# Patient Record
Sex: FEMALE | Race: WHITE | NOT HISPANIC OR LATINO | Employment: UNEMPLOYED | URBAN - METROPOLITAN AREA
[De-identification: names, ages, dates, MRNs, and addresses within clinical notes are randomized per-mention and may not be internally consistent; named-entity substitution may affect disease eponyms.]

---

## 2017-01-08 ENCOUNTER — HOSPITAL ENCOUNTER (INPATIENT)
Facility: HOSPITAL | Age: 62
LOS: 1 days | Discharge: HOME | End: 2017-01-10
Attending: EMERGENCY MEDICINE | Admitting: INTERNAL MEDICINE

## 2017-01-08 DIAGNOSIS — R93.5 ABNORMAL ABDOMINAL CT SCAN: ICD-10-CM

## 2017-01-08 DIAGNOSIS — D72.829 LEUKOCYTOSIS, UNSPECIFIED TYPE: ICD-10-CM

## 2017-01-08 DIAGNOSIS — K52.9 COLITIS: Primary | ICD-10-CM

## 2017-01-08 PROCEDURE — 99284 EMERGENCY DEPT VISIT MOD MDM: CPT

## 2017-01-08 NOTE — IP AVS SNAPSHOT
AFTER VISIT SUMMARY             Elif Cardona           About your hospitalization     You were admitted on:  January 9, 2017 You last received care in the:  [place] [address] [facility]       Procedures & Surgeries         Medications    If you or your caregiver advised us that you are currently taking a medication and that medication is marked below as “Resume”, this simply indicates that we have reviewed those medications to make sure our new therapy recommendations do not interfere.  If you have concerns about medications other than those new ones which we are prescribing today, please consult the physician who prescribed them (or your primary physician).  Our review of your home medications is not meant to indicate that we are directing their use.             Your Medications      START taking these medications     metroNIDAZOLE 500 MG tablet   Take 1 tablet by mouth Every 8 (Eight) Hours for 10 days. Indications: Clostridium Difficile Infection   Last time this was given:  1/10/2017  5:39 AM   Commonly known as:  FLAGYL           simethicone 80 MG chewable tablet   Chew 1 tablet 4 (Four) Times a Day As Needed for flatulence.   Commonly known as:  MYLICON             CHANGE how you take these medications     alendronate 70 MG tablet   Take 1 tablet by mouth every 7 days. For bone   According to our records, you may have been taking this medication differently.   Commonly known as:  FOSAMAX   What changed:  additional instructions             CONTINUE taking these medications     cetirizine 10 MG chewable tablet   Chew 10 mg daily.   Commonly known as:  ZyrTEC           desvenlafaxine 50 MG 24 hr tablet   Take 50 mg by mouth Daily.   Last time this was given:  1/10/2017  8:16 AM   Commonly known as:  PRISTIQ           gabapentin 100 MG capsule   Take two pills three times per day   Commonly known as:  NEURONTIN           hyoscyamine 0.375 MG 12 hr tablet   Take 1 tablet by mouth every 12 (twelve) hours as  needed for cramping. PRN Abd cramping and IBS   Commonly known as:  LEVBID           lidocaine-hydrocortisone 3-0.5 % cream rectal cream   Change order to this dose and still BID prn           NYQUIL PO   Take  by mouth Daily As Needed.           risperiDONE 1 MG tablet   Take 2 mg by mouth Daily.   Last time this was given:  1/10/2017  8:16 AM   Commonly known as:  risperDAL             STOP taking these medications     cefdinir 300 MG capsule   Commonly known as:  OMNICEF           MethylPREDNISolone 4 MG tablet   Commonly known as:  MEDROL (DAMIAN)                Where to Get Your Medications      These medications were sent to [facility] [address] [place] [address] - [address] AT [facility] - [phone]  - [phone]   [address], [facility] [address]    Hours:  24-hours Phone:  [phone]     metroNIDAZOLE 500 MG tablet    simethicone 80 MG chewable tablet                  Your Medications      Your Medication List           Morning Noon Evening Bedtime As Needed    alendronate 70 MG tablet   Take 1 tablet by mouth every 7 days. For bone   Commonly known as:  FOSAMAX                                cetirizine 10 MG chewable tablet   Chew 10 mg daily.   Commonly known as:  ZyrTEC                                   desvenlafaxine 50 MG 24 hr tablet   Take 50 mg by mouth Daily.   Commonly known as:  PRISTIQ                                   gabapentin 100 MG capsule   Take two pills three times per day   Commonly known as:  NEURONTIN                                         hyoscyamine 0.375 MG 12 hr tablet   Take 1 tablet by mouth every 12 (twelve) hours as needed for cramping. PRN Abd cramping and IBS   Commonly known as:  LEVBID                                   lidocaine-hydrocortisone 3-0.5 % cream rectal cream   Change order to this dose and still BID prn                                      metroNIDAZOLE 500 MG tablet   Take 1 tablet by mouth Every 8  (Eight) Hours for 10 days. Indications: Clostridium Difficile Infection   Commonly known as:  FLAGYL                                NYQUIL PO   Take  by mouth Daily As Needed.                                   risperiDONE 1 MG tablet   Take 2 mg by mouth Daily.   Commonly known as:  risperDAL                                   simethicone 80 MG chewable tablet   Chew 1 tablet 4 (Four) Times a Day As Needed for flatulence.   Commonly known as:  MYLICON                                            Instructions for After Discharge        Discharge References/Attachments     CLOSTRIDIUM DIFFICILE INFECTION, EASY-TO-READ (ENGLISH)    METRONIDAZOLE TABLETS OR CAPSULES (ENGLISH)    SIMETHICONE CHEWABLE TABLETS (ENGLISH)       Follow-ups for After Discharge        Follow-up Information     Follow up with CHRISTINA Pearson Follow up in 4 week(s).    Specialty:  Gastroenterology    Contact information:    [address] [facility] [address]  [facility] [address] [phone]          Follow up with Quinton Sin MD Follow up in 1 week(s).    Specialty:  Family Medicine    Contact information:    [address]  [facility] [address] [phone]        Scheduled Appointments     2017  1:00 PM [facility] Follow Up with Quinton Sin MD   [facility] (--)    [address] [facility]  [facility] [id number] [phone]              [employer] Signup     [facility] [employer] allows you to send messages to your doctor, view your test results, renew your prescriptions, schedule appointments, and more. To sign up, go to [employer] and click on the Sign Up Now link in the New User? box. Enter your [employer] Activation Code exactly as it appears below along with the last four digits of your Social Security Number and your Date of Birth () to complete the sign-up process. If you do not sign up before the expiration date, you must request a new  code.    MyChart Activation Code: [email or web address]  Expires: 1/20/2017  5:40 AM    If you have questions, you can email [email or web address] or call [phone] to talk to our MyChart staff. Remember, MyChart is NOT to be used for urgent needs. For medical emergencies, dial 911.           Summary of Your Hospitalization        Reason for Hospitalization     Your primary diagnosis was:  C. Difficile Colitis    Your diagnoses also included:  Infection In Bloodstream, Generalized Anxiety Disorder, Benign Essential Hypertension, Spasmodic Colon, Mental Health Problem, Solitary Pulmonary Nodule On Lung Ct, Pancreatic Divisum      Care Providers     Provider Service Role Specialty    Garfield Holm MD Medicine Attending Provider Internal Medicine    Phoebe Mac MD Gastroenterology Consulting Physician  Gastroenterology      Your Allergies  Date Reviewed: 1/9/2017    No active allergies      Pending Labs     Order Current Status    Stool Culture Preliminary result      Patient Belongings Returned     Document Return of Belongings Flowsheet     Were the patient bedside belongings sent home?   Yes   Belongings Retrieved from Security & Sent Home   N/A    Belongings Sent to Safe   --   Medications Retrieved from Pharmacy & Sent Home   N/A              More Information      Clostridium Difficile Infection  Clostridium difficile (C. difficile or C. diff) is a germ found in the intestines. C. difficile infection can happen after taking antibiotic medicines. Antiobiotics may cause the C. difficile to grow out of control and make a toxin that causes the infection. C. difficile infection can cause watery poop (diarrhea) or severe disease.  HOME CARE  · Drink enough fluids to keep your pee (urine) clear or pale yellow. Avoid milk, caffeine, and alcohol.  · Ask your doctor how to replace the body fluids you lost (rehydrate).  · Eat small meals often. Avoid eating large meals.  · Take your antibiotics as told.  Finish them even if you start to feel better.  · Do not  take medicines that help watery poop. These medicines may stop you from healing as fast or cause problems.  · Wash your hands after using the bathroom and before touching food. Make sure people who live with you wash their hands often.  · Clean all surfaces in your home. Use a product that has chlorine bleach in it.  GET HELP IF:  · Your watery poop lasts longer than expected.  · Your watery poop comes back after you finish your antibiotic medicine.  · You feel very dry or thirsty (dehydrated).  · You have a fever.  GET HELP RIGHT AWAY IF:   · You have more stomach pain or tenderness.  · You have blood in your poop (stool). Your poop may look black and tar-like.  · You cannot eat or drink without throwing up (vomiting).     This information is not intended to replace advice given to you by your health care provider. Make sure you discuss any questions you have with your health care provider.     Document Released: 10/15/2010 Document Revised: 01/08/2016 Document Reviewed: 06/20/2016  [employer] Interactive Patient Education ©2016 Elsevier Inc.          Metronidazole tablets or capsules  What is this medicine?  METRONIDAZOLE (me troe NI da zole) is an antiinfective. It is used to treat certain kinds of bacterial and protozoal infections. It will not work for colds, flu, or other viral infections.  This medicine may be used for other purposes; ask your health care provider or pharmacist if you have questions.  What should I tell my health care provider before I take this medicine?  They need to know if you have any of these conditions:  -anemia or other blood disorders  -disease of the nervous system  -fungal or yeast infection  -if you drink alcohol containing drinks  -liver disease  -seizures  -an unusual or allergic reaction to metronidazole, or other medicines, foods, dyes, or preservatives  -pregnant or trying to get pregnant  -breast-feeding  How should I  use this medicine?  Take this medicine by mouth with a full glass of water. Follow the directions on the prescription label. Take your medicine at regular intervals. Do not take your medicine more often than directed. Take all of your medicine as directed even if you think you are better. Do not skip doses or stop your medicine early.  Talk to your pediatrician regarding the use of this medicine in children. Special care may be needed.  Overdosage: If you think you have taken too much of this medicine contact a poison control center or emergency room at once.  NOTE: This medicine is only for you. Do not share this medicine with others.  What if I miss a dose?  If you miss a dose, take it as soon as you can. If it is almost time for your next dose, take only that dose. Do not take double or extra doses.  What may interact with this medicine?  Do not take this medicine with any of the following medications:  -alcohol or any product that contains alcohol  -amprenavir oral solution  -cisapride  -disulfiram  -dofetilide  -dronedarone  -paclitaxel injection  -pimozide  -ritonavir oral solution  -sertraline oral solution  -sulfamethoxazole-trimethoprim injection  -thioridazine  -ziprasidone  This medicine may also interact with the following medications:  -birth control pills  -cimetidine  -lithium  -other medicines that prolong the QT interval (cause an abnormal heart rhythm)  -phenobarbital  -phenytoin  -warfarin  This list may not describe all possible interactions. Give your health care provider a list of all the medicines, herbs, non-prescription drugs, or dietary supplements you use. Also tell them if you smoke, drink alcohol, or use illegal drugs. Some items may interact with your medicine.  What should I watch for while using this medicine?  Tell your doctor or health care professional if your symptoms do not improve or if they get worse.  You may get drowsy or dizzy. Do not drive, use machinery, or do anything  that needs mental alertness until you know how this medicine affects you. Do not stand or sit up quickly, especially if you are an older patient. This reduces the risk of dizzy or fainting spells.  Avoid alcoholic drinks while you are taking this medicine and for three days afterward. Alcohol may make you feel dizzy, sick, or flushed.  If you are being treated for a sexually transmitted disease, avoid sexual contact until you have finished your treatment. Your sexual partner may also need treatment.  What side effects may I notice from receiving this medicine?  Side effects that you should report to your doctor or health care professional as soon as possible:  -allergic reactions like skin rash or hives, swelling of the face, lips, or tongue  -confusion, clumsiness  -difficulty speaking  -discolored or sore mouth  -dizziness  -fever, infection  -numbness, tingling, pain or weakness in the hands or feet  -trouble passing urine or change in the amount of urine  -redness, blistering, peeling or loosening of the skin, including inside the mouth  -seizures  -unusually weak or tired  -vaginal irritation, dryness, or discharge  Side effects that usually do not require medical attention (report to your doctor or health care professional if they continue or are bothersome):  -diarrhea  -headache  -irritability  -metallic taste  -nausea  -stomach pain or cramps  -trouble sleeping  This list may not describe all possible side effects. Call your doctor for medical advice about side effects. You may report side effects to FDA at 8-332-FDA-9100.  Where should I keep my medicine?  Keep out of the reach of children.  Store at room temperature below 25 degrees C (77 degrees F). Protect from light. Keep container tightly closed. Throw away any unused medicine after the expiration date.  NOTE: This sheet is a summary. It may not cover all possible information. If you have questions about this medicine, talk to your doctor, pharmacist,  or health care provider.     © 2016, Elsevier/Gold Standard. (2014-07-25 14:08:39)          Simethicone chewable tablets  What is this medicine?  SIMETHICONE (sye METH i gurinder) is used to decrease the discomfort caused by gas.  This medicine may be used for other purposes; ask your health care provider or pharmacist if you have questions.  What should I tell my health care provider before I take this medicine?  They need to know if you have any of these conditions:  -phenylketonuria  -an unusual or allergic reaction to simethicone, other medicines, foods, dyes, or preservatives  -pregnant or trying to get pregnant  -breast-feeding  How should I use this medicine?  Take this medicine by mouth. Crush or chew the tablets. Do not swallow them whole. Follow the directions on the label or those given to you by your doctor or health care professional. Do not take your medicine more often than directed.  Talk to your pediatrician regarding the use of this medicine in children. While this medicine may be used in children as young as 12 years for selected conditions, precautions do apply.  Overdosage: If you think you have taken too much of this medicine contact a poison control center or emergency room at once.  NOTE: This medicine is only for you. Do not share this medicine with others.  What if I miss a dose?  This does not apply. You will only use this medicine as needed for gas pain. Do not use double or extra doses.  What may interact with this medicine?  Interactions are not expected.  This list may not describe all possible interactions. Give your health care provider a list of all the medicines, herbs, non-prescription drugs, or dietary supplements you use. Also tell them if you smoke, drink alcohol, or use illegal drugs. Some items may interact with your medicine.  What should I watch for while using this medicine?  Tell your doctor or health care professional if your symptoms get worse, or if you have severe pain,  diarrhea, constipation, or blood in your stool. These could be signs of a more serious condition.  What side effects may I notice from receiving this medicine?  There are no reported side effects of this medicine.  This list may not describe all possible side effects. Call your doctor for medical advice about side effects. You may report side effects to FDA at 7-620-OAW-1250.  Where should I keep my medicine?  Keep out of the reach of children.  Store at room temperature between 15 and 30 degrees C (59 and 86 degrees F). Keep container tightly closed. Throw away any unused medicine after the expiration date.  NOTE: This sheet is a summary. It may not cover all possible information. If you have questions about this medicine, talk to your doctor, pharmacist, or health care provider.     © 2016, Elsevier/Gold Standard. (2009-08-19 15:45:11)            SYMPTOMS OF A STROKE    Call 911 or have someone take you to the Emergency Department if you have any of the following:    · Sudden numbness or weakness of your face, arm or leg especially on one side of the body  · Sudden confusion, diffiiculty speaking or trouble understanding   · Changes in your vision or loss of sight in one eye  · Sudden severe headache with no known cause  · sudden dizziness, trouble walking, loss of balance or coordination    It is important to seek emergency care right away if you have further stroke symptoms. If you get emergency help quickly, the powerful clot-dissolving medicines can reduce the disabilities caused by a stroke.     For more information:    American Stroke Association  5-223-7-STROKE  www.strokeassociation.org           IF YOU SMOKE OR USE TOBACCO PLEASE READ THE FOLLOWING:    Why is smoking bad for me?  Smoking increases the risk of heart disease, lung disease, vascular disease, stroke, and cancer.     If you smoke, STOP!    If you would like more information on quitting smoking, please visit the [employer] website:  [email or web address]   This link will provide additional resources including the QUIT line and the Beat the Pack support groups.     For more information:    American Cancer Society  ([phone] 935-8647    American Heart Association  1-640.437.1981               YOU ARE THE MOST IMPORTANT FACTOR IN YOUR RECOVERY.     Follow all instructions carefully.     I have reviewed my discharge instructions with my nurse, including the following information, if applicable:     Information about my illness and diagnosis   Follow up appointments (including lab draws)   Wound Care   Equipment Needs   Medications (new and continuing) along with side effects   Preventative information such as vaccines and smoking cessations   Diet   Pain   I know when to contact my Doctor's office or seek emergency care      I want my nurse to describe the side effects of my medications: YES NO   If the answer is no, I understand the side effects of my medications: YES NO   My nurse described the side effects of my medications in a way that I could understand: YES NO   I have taken my personal belongings and my own medications with me at discharge: YES NO            I have received this information and my questions have been answered. I have discussed any concerns I see with this plan with the nurse or physician. I understand these instructions.    Signature of Patient or Responsible Person: _____________________________________    Date: _________________  Time: __________________    Signature of Healthcare Provider: _______________________________________  Date: _________________  Time: __________________

## 2017-01-09 ENCOUNTER — APPOINTMENT (OUTPATIENT)
Dept: CT IMAGING | Facility: HOSPITAL | Age: 62
End: 2017-01-09

## 2017-01-09 PROBLEM — D72.829 LEUKOCYTOSIS: Status: ACTIVE | Noted: 2017-01-09

## 2017-01-09 PROBLEM — A41.9 SEPSIS (HCC): Status: ACTIVE | Noted: 2017-01-09

## 2017-01-09 PROBLEM — K52.9 COLITIS: Status: ACTIVE | Noted: 2017-01-09

## 2017-01-09 PROBLEM — Q45.3 PANCREATIC DIVISUM: Status: ACTIVE | Noted: 2017-01-09

## 2017-01-09 PROBLEM — A04.72 C. DIFFICILE COLITIS: Status: ACTIVE | Noted: 2017-01-09

## 2017-01-09 PROBLEM — R91.1 SOLITARY PULMONARY NODULE ON LUNG CT: Status: ACTIVE | Noted: 2017-01-09

## 2017-01-09 LAB
ALBUMIN SERPL-MCNC: 4 G/DL (ref 3.5–5.2)
ALBUMIN/GLOB SERPL: 1.5 G/DL
ALP SERPL-CCNC: 67 U/L (ref 39–117)
ALT SERPL W P-5'-P-CCNC: 13 U/L (ref 1–33)
ANION GAP SERPL CALCULATED.3IONS-SCNC: 13.4 MMOL/L
AST SERPL-CCNC: 15 U/L (ref 1–32)
BACTERIA UR QL AUTO: ABNORMAL /HPF
BASOPHILS # BLD AUTO: 0.06 10*3/MM3 (ref 0–0.2)
BASOPHILS NFR BLD AUTO: 0.4 % (ref 0–1.5)
BILIRUB SERPL-MCNC: 0.3 MG/DL (ref 0.1–1.2)
BILIRUB UR QL STRIP: NEGATIVE
BUN BLD-MCNC: 6 MG/DL (ref 6–20)
BUN/CREAT SERPL: 8.3 (ref 7–25)
C DIFF TOX GENS STL QL NAA+PROBE: POSITIVE
CALCIUM SPEC-SCNC: 9.1 MG/DL (ref 8.6–10.5)
CHLORIDE SERPL-SCNC: 100 MMOL/L (ref 98–107)
CLARITY UR: CLEAR
CO2 SERPL-SCNC: 26.6 MMOL/L (ref 22–29)
COLOR UR: YELLOW
CREAT BLD-MCNC: 0.72 MG/DL (ref 0.57–1)
DEPRECATED RDW RBC AUTO: 46 FL (ref 37–54)
EOSINOPHIL # BLD AUTO: 0.11 10*3/MM3 (ref 0–0.7)
EOSINOPHIL NFR BLD AUTO: 0.7 % (ref 0.3–6.2)
ERYTHROCYTE [DISTWIDTH] IN BLOOD BY AUTOMATED COUNT: 13.2 % (ref 11.7–13)
GFR SERPL CREATININE-BSD FRML MDRD: 83 ML/MIN/1.73
GLOBULIN UR ELPH-MCNC: 2.7 GM/DL
GLUCOSE BLD-MCNC: 118 MG/DL (ref 65–99)
GLUCOSE UR STRIP-MCNC: NEGATIVE MG/DL
HCT VFR BLD AUTO: 41.7 % (ref 35.6–45.5)
HGB BLD-MCNC: 12.9 G/DL (ref 11.9–15.5)
HGB UR QL STRIP.AUTO: ABNORMAL
HYALINE CASTS UR QL AUTO: ABNORMAL /LPF
IMM GRANULOCYTES # BLD: 0.04 10*3/MM3 (ref 0–0.03)
IMM GRANULOCYTES NFR BLD: 0.3 % (ref 0–0.5)
KETONES UR QL STRIP: NEGATIVE
LEUKOCYTE ESTERASE UR QL STRIP.AUTO: NEGATIVE
LIPASE SERPL-CCNC: 20 U/L (ref 13–60)
LYMPHOCYTES # BLD AUTO: 2.01 10*3/MM3 (ref 0.9–4.8)
LYMPHOCYTES NFR BLD AUTO: 13 % (ref 19.6–45.3)
MCH RBC QN AUTO: 29.8 PG (ref 26.9–32)
MCHC RBC AUTO-ENTMCNC: 30.9 G/DL (ref 32.4–36.3)
MCV RBC AUTO: 96.3 FL (ref 80.5–98.2)
MONOCYTES # BLD AUTO: 0.81 10*3/MM3 (ref 0.2–1.2)
MONOCYTES NFR BLD AUTO: 5.2 % (ref 5–12)
NEUTROPHILS # BLD AUTO: 12.4 10*3/MM3 (ref 1.9–8.1)
NEUTROPHILS NFR BLD AUTO: 80.4 % (ref 42.7–76)
NITRITE UR QL STRIP: NEGATIVE
PH UR STRIP.AUTO: 6.5 [PH] (ref 5–8)
PLATELET # BLD AUTO: 479 10*3/MM3 (ref 140–500)
PMV BLD AUTO: 9.4 FL (ref 6–12)
POTASSIUM BLD-SCNC: 4 MMOL/L (ref 3.5–5.2)
PROT SERPL-MCNC: 6.7 G/DL (ref 6–8.5)
PROT UR QL STRIP: NEGATIVE
RBC # BLD AUTO: 4.33 10*6/MM3 (ref 3.9–5.2)
RBC # UR: ABNORMAL /HPF
REF LAB TEST METHOD: ABNORMAL
SODIUM BLD-SCNC: 140 MMOL/L (ref 136–145)
SP GR UR STRIP: >=1.03 (ref 1–1.03)
SQUAMOUS #/AREA URNS HPF: ABNORMAL /HPF
UROBILINOGEN UR QL STRIP: ABNORMAL
WBC NRBC COR # BLD: 15.43 10*3/MM3 (ref 4.5–10.7)
WBC UR QL AUTO: ABNORMAL /HPF

## 2017-01-09 PROCEDURE — 0 IOPAMIDOL 61 % SOLUTION: Performed by: EMERGENCY MEDICINE

## 2017-01-09 PROCEDURE — 87046 STOOL CULTR AEROBIC BACT EA: CPT | Performed by: PHYSICIAN ASSISTANT

## 2017-01-09 PROCEDURE — 99254 IP/OBS CNSLTJ NEW/EST MOD 60: CPT | Performed by: INTERNAL MEDICINE

## 2017-01-09 PROCEDURE — 87493 C DIFF AMPLIFIED PROBE: CPT | Performed by: PHYSICIAN ASSISTANT

## 2017-01-09 PROCEDURE — 85025 COMPLETE CBC W/AUTO DIFF WBC: CPT | Performed by: PHYSICIAN ASSISTANT

## 2017-01-09 PROCEDURE — 25010000002 ENOXAPARIN PER 10 MG: Performed by: INTERNAL MEDICINE

## 2017-01-09 PROCEDURE — 80053 COMPREHEN METABOLIC PANEL: CPT | Performed by: PHYSICIAN ASSISTANT

## 2017-01-09 PROCEDURE — 74177 CT ABD & PELVIS W/CONTRAST: CPT

## 2017-01-09 PROCEDURE — 83690 ASSAY OF LIPASE: CPT | Performed by: PHYSICIAN ASSISTANT

## 2017-01-09 PROCEDURE — 81001 URINALYSIS AUTO W/SCOPE: CPT | Performed by: PHYSICIAN ASSISTANT

## 2017-01-09 PROCEDURE — 25010000002 LEVOFLOXACIN PER 250 MG: Performed by: HOSPITALIST

## 2017-01-09 PROCEDURE — 87045 FECES CULTURE AEROBIC BACT: CPT | Performed by: PHYSICIAN ASSISTANT

## 2017-01-09 RX ORDER — CETIRIZINE HYDROCHLORIDE 10 MG/1
10 TABLET ORAL DAILY
Status: DISCONTINUED | OUTPATIENT
Start: 2017-01-09 | End: 2017-01-10 | Stop reason: HOSPADM

## 2017-01-09 RX ORDER — RISPERIDONE 2 MG/1
2 TABLET ORAL DAILY
Status: DISCONTINUED | OUTPATIENT
Start: 2017-01-09 | End: 2017-01-10 | Stop reason: HOSPADM

## 2017-01-09 RX ORDER — LEVOFLOXACIN 5 MG/ML
750 INJECTION, SOLUTION INTRAVENOUS ONCE
Status: DISCONTINUED | OUTPATIENT
Start: 2017-01-09 | End: 2017-01-09

## 2017-01-09 RX ORDER — SIMETHICONE 80 MG
80 TABLET,CHEWABLE ORAL 4 TIMES DAILY PRN
Status: DISCONTINUED | OUTPATIENT
Start: 2017-01-09 | End: 2017-01-10 | Stop reason: HOSPADM

## 2017-01-09 RX ORDER — ONDANSETRON 2 MG/ML
4 INJECTION INTRAMUSCULAR; INTRAVENOUS EVERY 6 HOURS PRN
Status: DISCONTINUED | OUTPATIENT
Start: 2017-01-09 | End: 2017-01-10 | Stop reason: HOSPADM

## 2017-01-09 RX ORDER — SODIUM CHLORIDE 0.9 % (FLUSH) 0.9 %
1-10 SYRINGE (ML) INJECTION AS NEEDED
Status: DISCONTINUED | OUTPATIENT
Start: 2017-01-09 | End: 2017-01-10 | Stop reason: HOSPADM

## 2017-01-09 RX ORDER — METRONIDAZOLE 500 MG/1
500 TABLET ORAL EVERY 8 HOURS SCHEDULED
Status: DISCONTINUED | OUTPATIENT
Start: 2017-01-09 | End: 2017-01-10 | Stop reason: HOSPADM

## 2017-01-09 RX ORDER — ACETAMINOPHEN 325 MG/1
650 TABLET ORAL EVERY 4 HOURS PRN
Status: DISCONTINUED | OUTPATIENT
Start: 2017-01-09 | End: 2017-01-10 | Stop reason: HOSPADM

## 2017-01-09 RX ORDER — HYDROCODONE BITARTRATE AND ACETAMINOPHEN 5; 325 MG/1; MG/1
1 TABLET ORAL EVERY 4 HOURS PRN
Status: DISCONTINUED | OUTPATIENT
Start: 2017-01-09 | End: 2017-01-10 | Stop reason: HOSPADM

## 2017-01-09 RX ORDER — SODIUM CHLORIDE 9 MG/ML
125 INJECTION, SOLUTION INTRAVENOUS CONTINUOUS
Status: DISCONTINUED | OUTPATIENT
Start: 2017-01-09 | End: 2017-01-10 | Stop reason: HOSPADM

## 2017-01-09 RX ORDER — HYOSCYAMINE SULFATE EXTENDED-RELEASE 0.38 MG/1
375 TABLET ORAL EVERY 12 HOURS PRN
Status: DISCONTINUED | OUTPATIENT
Start: 2017-01-09 | End: 2017-01-10 | Stop reason: HOSPADM

## 2017-01-09 RX ORDER — DESVENLAFAXINE 50 MG/1
50 TABLET, EXTENDED RELEASE ORAL DAILY
Status: DISCONTINUED | OUTPATIENT
Start: 2017-01-09 | End: 2017-01-10 | Stop reason: HOSPADM

## 2017-01-09 RX ORDER — LEVOFLOXACIN 5 MG/ML
500 INJECTION, SOLUTION INTRAVENOUS EVERY 24 HOURS
Status: DISCONTINUED | OUTPATIENT
Start: 2017-01-09 | End: 2017-01-09

## 2017-01-09 RX ORDER — SACCHAROMYCES BOULARDII 250 MG
250 CAPSULE ORAL 2 TIMES DAILY
Status: DISCONTINUED | OUTPATIENT
Start: 2017-01-09 | End: 2017-01-10 | Stop reason: HOSPADM

## 2017-01-09 RX ADMIN — IOPAMIDOL 85 ML: 612 INJECTION, SOLUTION INTRAVENOUS at 01:27

## 2017-01-09 RX ADMIN — HYDROCODONE BITARTRATE AND ACETAMINOPHEN 1 TABLET: 5; 325 TABLET ORAL at 21:38

## 2017-01-09 RX ADMIN — SODIUM CHLORIDE 500 ML: 9 INJECTION, SOLUTION INTRAVENOUS at 03:58

## 2017-01-09 RX ADMIN — RISPERIDONE 2 MG: 2 TABLET, FILM COATED ORAL at 11:48

## 2017-01-09 RX ADMIN — SODIUM CHLORIDE 125 ML/HR: 9 INJECTION, SOLUTION INTRAVENOUS at 17:20

## 2017-01-09 RX ADMIN — HYDROCODONE BITARTRATE AND ACETAMINOPHEN 1 TABLET: 5; 325 TABLET ORAL at 10:41

## 2017-01-09 RX ADMIN — ENOXAPARIN SODIUM 40 MG: 40 INJECTION SUBCUTANEOUS at 10:41

## 2017-01-09 RX ADMIN — DESVENLAFAXINE SUCCINATE 50 MG: 50 TABLET, EXTENDED RELEASE ORAL at 11:48

## 2017-01-09 RX ADMIN — METRONIDAZOLE 500 MG: 500 INJECTION, SOLUTION INTRAVENOUS at 05:52

## 2017-01-09 RX ADMIN — METRONIDAZOLE 500 MG: 500 TABLET ORAL at 21:38

## 2017-01-09 RX ADMIN — SODIUM CHLORIDE 125 ML/HR: 9 INJECTION, SOLUTION INTRAVENOUS at 08:50

## 2017-01-09 RX ADMIN — LEVOFLOXACIN 500 MG: 500 INJECTION, SOLUTION INTRAVENOUS at 07:42

## 2017-01-09 RX ADMIN — METRONIDAZOLE 500 MG: 500 TABLET ORAL at 13:21

## 2017-01-09 RX ADMIN — Medication 250 MG: at 17:20

## 2017-01-09 RX ADMIN — CETIRIZINE HYDROCHLORIDE 10 MG: 10 TABLET, FILM COATED ORAL at 11:48

## 2017-01-09 RX ADMIN — HYDROCODONE BITARTRATE AND ACETAMINOPHEN 1 TABLET: 5; 325 TABLET ORAL at 16:52

## 2017-01-09 NOTE — PROGRESS NOTES
Discharge Planning Assessment  [facility]     Patient Name: Elif Cardona  MRN: [id number]  Today's Date: 1/9/2017    Admit Date: 1/8/2017          Discharge Needs Assessment       01/09/17 1403    Living Environment    Lives With spouse    Living Arrangements house    Provides Primary Care For no one    Quality Of Family Relationships supportive    Able to Return to Prior Living Arrangements yes    Discharge Needs Assessment    Concerns To Be Addressed no discharge needs identified;denies needs/concerns at this time    Anticipated Changes Related to Illness none    Equipment Currently Used at Home none    Equipment Needed After Discharge none    Transportation Available car;family or friend will provide            Discharge Plan       01/09/17 4009    Case Management/Social Work Plan    Plan Home w/o needs    Additional Comments Pt confirmed the address, PCP and pharmacy are correct, she said she is IADL, uses no DME, drives and has transportation. Pt said she can afford her Rx but her dr changed her from Zoloft to Pristiq and it is about $150 / month copay and she is going to ask her dr to change her back to Zoloft.  Pt said she plans home at discharge and does not anticipate any discharge needs.  I talked with pt about [email or web address] but she said she will just ask the dr to change her back to Zoloft.         Discharge Placement     No information found                Demographic Summary       01/09/17 1403    Referral Information    Admission Type inpatient    Arrived From home or self-care    Referral Source admission list            Functional Status       01/09/17 1403    Functional Status Current    Ambulation 0-->independent    Transferring 0-->independent    Toileting 0-->independent    Bathing 0-->independent    Dressing 0-->independent    Eating 0-->independent    Communication 0-->understands/communicates without difficulty    Swallowing (if score 2 or more for any item, consult Rehab Services)  0-->swallows foods/liquids without difficulty    Change in Functional Status Since Onset of Current Illness/Injury no      Patient Forms       01/09/17 1404    Patient Forms    Provider Choice List Delivered    Delivered to Patient    Method of delivery In person          Sofiya Mchugh, RN

## 2017-01-09 NOTE — PAYOR COMM NOTE
"Elif Cardona (61 y.o. Female)     Date of Birth Social Security Number Address Home Phone MRN    1955  [id number]  [facility] [address] [phone] [id number]    Zoroastrian Marital Status          Cheondoism        Admission Date Admission Type Admitting Provider Attending Provider Department, Room/Bed    17 Emergency Garfield Holm MD [place], Garfield Perez MD [address], 79/1    Discharge Date Discharge Disposition Discharge Destination                      Attending Provider: Garfield Holm MD     Allergies:  No Known Allergies    Isolation:  Spore   Infection:  C.difficile (17)   Code Status:  FULL    Ht:  64\" (162.6 cm)   Wt:  150 lb (68 kg)    Admission Cmt:  None   Principal Problem:  C. difficile colitis [A04.7]                 Active Insurance as of 2017     Primary Coverage     Payor Plan Insurance Group Employer/Plan Group    [employer] ANTHEM PATHWAYS NON PAR [id number]     Payor Plan Address Payor Plan Phone Number Effective From Effective To    [address] [phone] 2017     [place] [address]       Subscriber Name Subscriber Birth Date Member ID       TAMICA CARDONA 3/14/1954 [id number]           Secondary Coverage     Payor Plan Insurance Group Employer/Plan Group    [employer] ANTHEM PATHWAYS PAR [id number]     Payor Plan Address Payor Plan Phone Number Effective From Effective To    [address] [phone] 3/1/2016     [place] [address]       Subscriber Name Subscriber Birth Date Member ID       TAMICA CARDONA 3/14/1954 [id number]                 Emergency Contacts      (Rel.) Home Phone Work Phone Mobile Phone    Brian Cardona (Spouse) [phone] -- --    Ewelina Sheffield (Sister) [phone] -- --    Delmy Styles (Sister) [phone] -- --               History & Physical      Garfield Holm MD at 2017 10:29 AM              Name: Elif Cardona ADMIT: 2017   : " 1955  PCP: Quinton Sin MD    MRN: [id number] LOS: 0 days   AGE/SEX: 61 y.o. female  ROOM: [facility]/1     Chief Complaint   Patient presents with   • Diarrhea       Subjective   Patient is a 61 y.o. female presents with the following...    History of Present Illness  The patient is a 61-year-old white male with a previous history for hypertension, schizoaffective disorder, IBS who presented to the hospital for diarrhea.  3-4 days prior to admission she started with what she thought was a stomach flu with nausea, vomiting and diarrhea.  At the current time she actually complains of stomach gas.  Diarrhea has improved overall.  A CT of this can of the abdomen in the emergency room showed colitis and she was admitted to our service.  She has not had any sick contacts but has had recent antibiotics 2 weeks ago with Omnicef for an upper respiratory infection.  She has had some bright red blood per rectum due to this illness as well.  She was concerned about all of the above and came to the ER.  According to emergency room notes the patient has had vaginal bleeding despite menopause but upon further questioning the patient says she thinks it was from her rectum rather than her vagina.  In addition, the CAT scan showed pancreatic divisum with dilation of her pancreatic ducts but there were no masses.  Patient does not have any history of pancreatitis there is no history of pancreatic cancer.  She denies any abdominal pain or nausea at the current time.  Past Medical History   Diagnosis Date   • Allergic rhinitis    • Benign essential hypertension 01/06/2015   • Fibromyalgia    • Generalized anxiety disorder 08/24/2012   • H/O exercise stress test 05/05/2005     CVA neg   • Hemorrhoids 01/27/2010   • History of bone density study    • Irritable bowel syndrome (IBS) 11/11/2009   • Major depressive disorder, recurrent episode, in full remission 09/10/2010   • Osteoarthritis, multiple sites 06/12/2014   • Post-menopausal     • Rash and nonspecific skin eruption 07/08/2014     Angular Chelitis   • Schizoaffective disorder    • Schizoaffective disorder, bipolar type    • Sebaceous cyst 10/25/2011   • Sinusitis, acute      History reviewed. No pertinent past surgical history.  Family History   Problem Relation Age of Onset   • Colon cancer Other    • Depression Other    • Diabetes Other    • Heart disease Other    • Lung cancer Other      Social History   Substance Use Topics   • Smoking status: Former Smoker     Packs/day: 0.50     Quit date: 5/2/2016   • Smokeless tobacco: Never Used   • Alcohol use No     Prescriptions Prior to Admission   Medication Sig Dispense Refill Last Dose   • alendronate (FOSAMAX) 70 MG tablet Take 1 tablet by mouth every 7 days. For bone (Patient taking differently: Take 70 mg by mouth Every 7 (Seven) Days. For bone. Pt took it last on Friday 1/6/17) 4 tablet 11 Taking   • cefdinir (OMNICEF) 300 MG capsule Two caps PO daily for infection 20 capsule 1 Past Week at Unknown time   • cetirizine (ZyrTEC) 10 MG chewable tablet Chew 10 mg daily.   Taking   • desvenlafaxine (PRISTIQ) 50 MG 24 hr tablet Take 50 mg by mouth Daily.   Past Week at Unknown time   • hyoscyamine (LEVBID) 0.375 MG 12 hr tablet Take 1 tablet by mouth every 12 (twelve) hours as needed for cramping. PRN Abd cramping and IBS 60 tablet 5 1/8/2017 at Unknown time   • lidocaine-hydrocortisone 3-0.5 % cream rectal cream Change order to this dose and still BID prn 85 g 2 Past Week at Unknown time   • Pseudoeph-Doxylamine-DM-APAP (NYQUIL PO) Take  by mouth Daily As Needed.   Taking   • risperiDONE (RisperDAL) 1 MG tablet Take 2 mg by mouth Daily.   Past Week at Unknown time   • gabapentin (NEURONTIN) 100 MG capsule Take two pills three times per day 90 capsule 0 Taking   • MethylPREDNISolone (MEDROL, DAMIAN,) 4 MG tablet follow package directions 21 tablet 0      Allergies:  Review of patient's allergies indicates no known allergies.    Review of Systems    Constitutional: Positive for appetite change and fatigue. Negative for activity change, fever and unexpected weight change.   HENT: Negative for mouth sores, sore throat and trouble swallowing.    Eyes: Negative for pain and visual disturbance.   Respiratory: Negative for cough, chest tightness and shortness of breath.    Cardiovascular: Negative for chest pain, palpitations and leg swelling.   Gastrointestinal: Positive for blood in stool and diarrhea. Negative for abdominal pain, constipation, nausea and vomiting.   Endocrine:        Negative for Diabetes or thyroid disease   Genitourinary: Negative for difficulty urinating, hematuria, menstrual problem and vaginal bleeding.   Musculoskeletal: Negative.  Negative for back pain, neck pain and neck stiffness.   Skin: Negative for rash and wound.   Neurological: Negative for dizziness, syncope, weakness, light-headedness and headaches.   Hematological: Negative for adenopathy. Does not bruise/bleed easily.   Psychiatric/Behavioral: Negative for agitation, behavioral problems and confusion.        Objective    Vital Signs  Temp:  [97 °F (36.1 °C)-98.3 °F (36.8 °C)] 98.1 °F (36.7 °C)  Heart Rate:  [] 90  Resp:  [16-18] 16  BP: (151-163)/(81-97) 155/97  SpO2:  [90 %-96 %] 96 %  on   ;   O2 Device: room air  Body mass index is 25.75 kg/(m^2).    Physical Exam   Constitutional: She is oriented to person, place, and time. She appears well-developed and well-nourished. No distress.   HENT:   Head: Normocephalic and atraumatic.   Mouth/Throat: Oropharynx is clear and moist. No oropharyngeal exudate.   Eyes: Conjunctivae and EOM are normal. Pupils are equal, round, and reactive to light. No scleral icterus.   Neck: Normal range of motion. Neck supple. No JVD present. No thyromegaly present.   Cardiovascular: Normal rate, regular rhythm and normal heart sounds.  Exam reveals no gallop and no friction rub.    No murmur heard.  Pulmonary/Chest: Effort normal and breath  sounds normal. No stridor. No respiratory distress.   Abdominal: Soft. Bowel sounds are normal. She exhibits distension. There is no tenderness. There is no rebound and no guarding.   Tympanic.  Patient says abdomen is not more distended than what it usually is and there is no tenderness, guarding or rebound   Musculoskeletal: She exhibits no edema or tenderness.   Lymphadenopathy:     She has no cervical adenopathy.   Neurological: She is alert and oriented to person, place, and time. No cranial nerve deficit.   Skin: Skin is warm and dry. She is not diaphoretic.   Psychiatric: She has a normal mood and affect. Her behavior is normal.       Results Review:   I reviewed the patient's new clinical results.  Reviewed the CT of the abdomen and she does have mild thickening of her colon  Imaging Results (last 24 hours)     Procedure Component Value Units Date/Time    CT Abdomen Pelvis With Contrast [[id number]] Collected:  01/09/17 0147     Updated:  01/09/17 0147    Narrative:       CT ABDOMEN AND PELVIS WITH CONTRAST.     TECHNIQUE: A routine CT scan of the abdomen and pelvis was performed  with coronal and sagittal reconstructed images.     HISTORY: Abdominal pain, left lower quadrant pain, leukocytosis.     COMPARISON: 8/3/2004.     FINDINGS:  Lung bases demonstrate no consolidation or effusion.  0.7 cm nodule in  the right lung base, image 8 series 2. It was not seen on the remote  study of 8/31/2004. Mild bronchiectasis in both lower lobes.      Liver demonstrates homogeneous parenchyma, no definite mass.        Gallbladder does not demonstrate calculi or sludge.  No intra or  extrahepatic biliary ductal dilatation.      The spleen is unremarkable. The pancreas demonstrates disease and of the  pancreatic ducts, the pancreatic duct of Santorini and the pancreatic  duct of Wirsung open separately into the duodenum. Both ducts are  dilated, the pancreatic duct of Santorini is dilated to 4.7 mm, the duct  of Wirsung is  dilated to 5.2 cm. Adrenal glands are within normal  limits.     Kidneys do not demonstrate hydronephrosis or calculi.     Urinary  bladder is unremarkable.      Small bowel loops are within normal limits. There is mild chronic  mucosal thickening of the wall of the ascending colon and transverse  colon, however the wall of the descending colon demonstrates mural  thickening and very minimal surrounding fat stranding, similar changes  are seen in the sigmoid colon. Moderately large amount of stool is seen  in the colon. Diverticulosis of the colon.         No significant retroperitoneal lymphadenopathy.           Impression:       1.  Mild colitis of the descending colon and possibly the sigmoid colon.  No obstruction, perforation or abscess.   2.  Pancreatic divisum, the pancreatic ducts are slightly dilated, no  obstructing mass is seen, ERCP may be performed.  3.  0.7 cm nodule in the right lung base may be further followed up in 3  months, if available previous examinations may be helpful. The nodule  was not seen on an or study of 8/31/2004.                Assessment/Plan     Principal Problem:    C. difficile colitis  Active Problems:    Generalized anxiety disorder    Benign essential hypertension    Irritable bowel syndrome (IBS)    Schizoaffective disorder, bipolar type    Sepsis    Solitary pulmonary nodule on lung CT    Pancreatic divisum      Assessment & Plan    C. difficile colitis: Risk factor was antibiotics 2 weeks ago.  Stool cultures obtained in the emergency room just resulted with positive  for C. difficile.  I will discontinue Levaquin and change IV Flagyl to oral Flagyl.  Continue supportive care with IV fluids and advance diet as tolerated.  Given flatulence no added simethicone.  Her abdomen is tympanic.  Patient says her abdomen is not distended more than normal but if it does appear more distended tomorrow would consider x-ray    Sepsis: Heart rate over 3 and a blood cell count 15,000 with  C. difficile.  Continue antibiotics and supportive care     Pancreatic divisum with dilated pancreatic ducts: Could just be related to pancreatic divisum.  No signs of pancreatitis, will check lipase in the morning though.  Would like to consult GI to discuss the possibility of ERCP for further evaluation of the dilated ducts.    Hypertension: Reported history of hypertension but does not take any medications.  Observe for now.    Solitary pulmonary lung nodule in right lung base: 0.7 cm.  We'll repeat CT of the chest in 3 months    Schizoaffective disorder: Continue home medications    DVT prophylaxis with Lovenox    I discussed CODE STATUS with the patient and she is a full code.  Her healthcare surrogate is her     I discussed the patients findings and my recommendations with patient and nursing staff.          Garfield Holm MD  [facility]  01/09/17  10:41 AM         Electronically signed by Garfield Holm MD at 1/9/2017 10:42 AM           Emergency Department Notes      Kwabena Mclaughlin RN at 1/8/2017 10:28 PM          PT REPORTS DIARRHEA AND BRIGHT RED BLOOD IN STOOL FOR 3 DAYS. PT REPORTS LOWER ABD. PAIN      Kwabena Mclaughlin RN  01/08/17 2228       Electronically signed by Kwabena Mclaughlin RN at 1/8/2017 10:28 PM      STELLA Reid III at 1/8/2017 11:45 PM     Attestation signed by Syed Sargent MD at 1/9/2017  4:13 AM        I supervised care provided by the midlevel provider.    We have discussed this patient's history, physical exam, and treatment plan.   I have reviewed the note and personally saw and examined the patient and agree with the plan of care.        For this patient encounter, I reviewed the NP or PA documentation, treatment plan, and medical decision making. Syed Sargent MD 1/9/2017 4:13 AM                                EMERGENCY DEPARTMENT ENCOUNTER    CHIEF COMPLAINT  Chief Complaint: diarrhea with bright red blood in stool  History  given by: patient  History limited by: nothing   Room Number: 02/02  PMD: Quinton Sin MD    HPI:  Pt is a 61 y.o. female who presents complaining of intermittent diarrhea with bright red blood in stool for 1 week. Pt also complains of intermittent abd pain and intermittent vaginal bleeding despite menopause. Pt states she recently had a sinus infection for which she has been taking abx and stomach flu. Pt takes Hyoscyamine for IBS. Pt has taken Imodium without relief.     Duration:  1 week  Onset: Monday  Timing: intermittent   Location: generalized   Radiation: does not radiate   Quality: new  Intensity/Severity: moderate   Progression: unchanged   Associated Symptoms: diarrhea, blood in stool, abd pain, vaginal bleeding   Aggravating Factors: none specified   Alleviating Factors: none specified   Previous Episodes: h/o IBS  Treatment before arrival: antibiotic, Imodium     PAST MEDICAL HISTORY  Active Ambulatory Problems     Diagnosis Date Noted   • Allergic rhinitis    • Fibromyalgia    • Generalized anxiety disorder 08/24/2012   • Hemorrhoids 01/27/2010   • Benign essential hypertension 01/06/2015   • Irritable bowel syndrome (IBS)    • Major depressive disorder, recurrent episode, in full remission 09/10/2010   • Osteoarthritis, multiple sites 06/12/2014   • Post-menopausal    • Rash and nonspecific skin eruption 07/08/2014   • Sebaceous cyst 10/25/2011   • Sinusitis, acute    • Schizoaffective disorder, bipolar type 03/17/2016   • Hyperglycemia 03/18/2016   • Tobacco abuse 03/18/2016   • HTN (hypertension) 03/18/2016   • Peripheral neuropathic pain 06/22/2016   • Urinary tract infection without hematuria 08/24/2016     Resolved Ambulatory Problems     Diagnosis Date Noted   • No Resolved Ambulatory Problems     Past Medical History   Diagnosis Date   • H/O exercise stress test 05/05/2005   • History of bone density study    • Schizoaffective disorder        PAST SURGICAL HISTORY  History reviewed. No  pertinent past surgical history.    FAMILY HISTORY  Family History   Problem Relation Age of Onset   • Colon cancer Other    • Depression Other    • Diabetes Other    • Heart disease Other    • Lung cancer Other        SOCIAL HISTORY  Social History     Social History   • Marital status:      Spouse name: N/A   • Number of children: N/A   • Years of education: N/A     Occupational History   • Not on file.     Social History Main Topics   • Smoking status: Former Smoker     Packs/day: 0.50     Quit date: 5/2/2016   • Smokeless tobacco: Never Used   • Alcohol use No   • Drug use: No   • Sexual activity: Yes     Partners: Male     Other Topics Concern   • Not on file     Social History Narrative       ALLERGIES  Review of patient's allergies indicates no known allergies.    REVIEW OF SYSTEMS  Review of Systems   Constitutional: Negative for fever.   HENT: Negative for sore throat.    Eyes: Negative.    Respiratory: Negative for cough and shortness of breath.    Cardiovascular: Negative for chest pain.   Gastrointestinal: Positive for abdominal pain, blood in stool and diarrhea. Negative for vomiting.   Genitourinary: Positive for vaginal bleeding. Negative for dysuria.   Musculoskeletal: Negative for neck pain.   Skin: Negative for rash.   Allergic/Immunologic: Negative.    Neurological: Negative for weakness, numbness and headaches.   Hematological: Negative.    Psychiatric/Behavioral: Negative.    All other systems reviewed and are negative.      PHYSICAL EXAM  ED Triage Vitals   Temp Heart Rate Resp BP SpO2   01/08/17 2229 01/08/17 2229 01/08/17 2229 01/08/17 2231 01/08/17 2229   98.3 °F (36.8 °C) 103 18 157/90 90 %      Temp src Heart Rate Source Patient Position BP Location FiO2 (%)   01/08/17 2229 01/08/17 2229 01/08/17 2231 -- --   Tympanic Monitor Sitting         Physical Exam   Constitutional: She is oriented to person, place, and time and well-developed, well-nourished, and in no distress. No distress.    HENT:   Head: Normocephalic and atraumatic.   Eyes: EOM are normal. Pupils are equal, round, and reactive to light.   Neck: Normal range of motion. Neck supple.   Cardiovascular: Normal rate, regular rhythm and normal heart sounds.    Pulmonary/Chest: Effort normal and breath sounds normal. No respiratory distress.   Abdominal: Soft. There is tenderness in the left lower quadrant. There is no rebound and no guarding.   Musculoskeletal: Normal range of motion. She exhibits no edema.   Neurological: She is alert and oriented to person, place, and time. She has normal sensation and normal strength.   Skin: Skin is warm and dry. No rash noted.   Psychiatric: Mood and affect normal.   Nursing note and vitals reviewed.      LAB RESULTS  Lab Results (last 24 hours)     Procedure Component Value Units Date/Time    CBC & Differential [[id number]] Collected:  01/09/17 0027    Specimen:  Blood Updated:  01/09/17 0040    Narrative:       The following orders were created for panel order CBC & Differential.  Procedure                               Abnormality         Status                     ---------                               -----------         ------                     CBC Auto Differential[[id number]]         Abnormal            Final result                 Please view results for these tests on the individual orders.    Comprehensive Metabolic Panel [[id number]]  (Abnormal) Collected:  01/09/17 0027    Specimen:  Blood Updated:  01/09/17 0116     Glucose 118 (H) mg/dL      BUN 6 mg/dL      Creatinine 0.72 mg/dL      Sodium 140 mmol/L      Potassium 4.0 mmol/L      Chloride 100 mmol/L      CO2 26.6 mmol/L      Calcium 9.1 mg/dL      Total Protein 6.7 g/dL      Albumin 4.00 g/dL      ALT (SGPT) 13 U/L      AST (SGOT) 15 U/L      Alkaline Phosphatase 67 U/L      Total Bilirubin 0.3 mg/dL      eGFR Non African Amer 83 mL/min/1.73      Globulin 2.7 gm/dL      A/G Ratio 1.5 g/dL      BUN/Creatinine Ratio 8.3      Anion Gap  13.4 mmol/L     Lipase [[id number]]  (Normal) Collected:  01/09/17 0027    Specimen:  Blood Updated:  01/09/17 0116     Lipase 20 U/L     CBC Auto Differential [[id number]]  (Abnormal) Collected:  01/09/17 0027    Specimen:  Blood Updated:  01/09/17 0040     WBC 15.43 (H) 10*3/mm3      RBC 4.33 10*6/mm3      Hemoglobin 12.9 g/dL      Hematocrit 41.7 %      MCV 96.3 fL      MCH 29.8 pg      MCHC 30.9 (L) g/dL      RDW 13.2 (H) %      RDW-SD 46.0 fl      MPV 9.4 fL      Platelets 479 10*3/mm3      Neutrophil % 80.4 (H) %      Lymphocyte % 13.0 (L) %      Monocyte % 5.2 %      Eosinophil % 0.7 %      Basophil % 0.4 %      Immature Grans % 0.3 %      Neutrophils, Absolute 12.40 (H) 10*3/mm3      Lymphocytes, Absolute 2.01 10*3/mm3      Monocytes, Absolute 0.81 10*3/mm3      Eosinophils, Absolute 0.11 10*3/mm3      Basophils, Absolute 0.06 10*3/mm3      Immature Grans, Absolute 0.04 (H) 10*3/mm3     Urinalysis With / Culture If Indicated [[id number]]  (Abnormal) Collected:  01/09/17 0304    Specimen:  Urine from Urine, Clean Catch Updated:  01/09/17 0320     Color, UA Yellow      Appearance, UA Clear      pH, UA 6.5      Specific Gravity, UA >=1.030      Glucose, UA Negative      Ketones, UA Negative      Bilirubin, UA Negative      Blood, UA Trace (A)      Protein, UA Negative      Leuk Esterase, UA Negative      Nitrite, UA Negative      Urobilinogen, UA 0.2 E.U./dL     Urinalysis, Microscopic Only [[id number]]  (Abnormal) Collected:  01/09/17 0304    Specimen:  Urine from Urine, Clean Catch Updated:  01/09/17 0333     RBC, UA 0-2 (A) /HPF      WBC, UA 0-2 (A) /HPF      Bacteria, UA None Seen /HPF      Squamous Epithelial Cells, UA 3-6 (A) /HPF      Hyaline Casts, UA None Seen /LPF      Methodology Manual Light Microscopy           I ordered the above labs and reviewed the results    RADIOLOGY  CT Abdomen Pelvis With Contrast   Preliminary Result   1.  Mild colitis of the descending colon and possibly the sigmoid colon.   No  obstruction, perforation or abscess.    2.  Pancreatic divisum, the pancreatic ducts are slightly dilated, no   obstructing mass is seen, ERCP may be performed.   3.  0.7 cm nodule in the right lung base may be further followed up in 3   months, if available previous examinations may be helpful. The nodule   was not seen on an or study of 8/31/2004.                 I ordered the above noted radiological studies. Interpreted by radiologist. Reviewed by me in PACS.       PROCEDURES  Procedures      PROGRESS AND CONSULTS  ED Course       2358: Ordered labs for further evaluation.     0105: Ordered CT abd/pelvis.    0117: Rechecked pt. Pt is resting comfortably. Discussed plan to order CT abd/pelvis due to elevated WBC.     0248: Discussed pt's case with Dr. Sargent, who agrees with plan for care.     0312: Discussed pt's case with Dr. Durham (internal medicine), who agrees to admit pt. He requests that I start pt on Levaquin and Flagyl.     MEDICAL DECISION MAKING  Results were reviewed/discussed with the patient and they were also made aware of online access. Pt also made aware that some labs, such as cultures, will not be resulted during ER visit and follow up with PMD is necessary.     MDM  Number of Diagnoses or Management Options     Amount and/or Complexity of Data Reviewed  Clinical lab tests: reviewed and ordered  Tests in the radiology section of CPT®: ordered and reviewed  Decide to obtain previous medical records or to obtain history from someone other than the patient: yes  Discuss the patient with other providers: yes    Patient Progress  Patient progress: stable       DIAGNOSIS  Final diagnoses:   Colitis   Leukocytosis, unspecified type   Abnormal abdominal CT scan (pancreatic duct dilitation)       DISPOSITION  ADMISSION    Discussed treatment plan and reason for admission with pt/family and admitting physician.  Pt/family voiced understanding of the plan for admission for further testing/treatment as  "needed.     Latest Documented Vital Signs:  As of 3:54 AM  BP- 159/90 HR- 81 Temp- 98.3 °F (36.8 °C) (Tympanic) O2 sat- 93%    --  Documentation assistance provided by andrew Romero for Iker Arroyo.  Information recorded by the scribe was done at my direction and has been verified and validated               Maricel Romero  01/09/17 0326       STELLA Reid III  01/09/17 0354       Electronically signed by Syed Sargent MD at 1/9/2017  4:13 AM      Cristina Rose RN at 1/9/2017 12:25 AM          This RN attempted to place IV at this time during blood collection; pt yelled out \"i don't think this has ever hurt this bad!\". Pt explained IV access may be needed in case IV fluids are ordered. RN offered to take out IV and pt accepted. IV removed without signs of irritation- site was not infiltrated, no redness noted, no active bleeding, and catheter end intact with removal. Pt given ice pack and reassurance given. STELLA Arroyo notified, no new orders received.      Cristina Rose RN  01/09/17 0034       Electronically signed by Cristina Rose RN at 1/9/2017 12:34 AM      Cristina Rose RN at 1/9/2017 12:30 AM          Pt informed need of stool specimen if pt uses restroom.      Cristina Rose RN  01/09/17 0038       Electronically signed by Cristina Rose RN at 1/9/2017 12:38 AM      Cristina Rose RN at 1/9/2017  1:04 AM          Pt attempted to provide urine sample at this time, she reports \"i wiped but then forgot to place the cup under there.\" pt explained need for sample for dispo from ER.     Cristina Rose RN  01/09/17 0105       Electronically signed by Cristina Rose RN at 1/9/2017  1:05 AM      Cristina Rose RN at 1/9/2017  1:12 AM          This RN attempted to place second IV due to order for CT contrast, pt reports \"you went too deep last time, I am not happy with my treatment here and its so dirty here.\" Suggested different RN to attempt IV access and patient agreed 2nd RN at " bedside for placement.      Cristina Rose RN  01/09/17 0115       Electronically signed by Cristina Rose RN at 1/9/2017  1:15 AM      Syed Sargent MD at 1/9/2017  2:53 AM          History   Pt presents to the ED with diarrhea and lower abd pain that has been intermittent for the past week. Pt reports recent abx use secondary to a sinus infection.     Exam  Abd is soft with minimal RLQ and LLQ tenderness    Course  Pt will be admitted.    Attestation:  I supervised care provided by the midlevel provider.    We have discussed this patient's history, physical exam, and treatment plan.    I have reviewed the note and personally saw and examined the patient and agree with the plan of care.  I agree with the midlevel provider's findings and plan. I reviewed the midlevel providers note.     Documentation assistance provided by andrew Cummings for Dr Sargent Information recorded by the scribdorota was done at my direction and has been verified and validated by me.       Kirsten Cummings  01/09/17 0257       Syed Sargent MD  01/09/17 0413       Electronically signed by Syed Sargent MD at 1/9/2017  4:13 AM        Hospital Medications (active)       Dose Frequency Start End    acetaminophen (TYLENOL) tablet 650 mg 650 mg Every 4 Hours PRN 1/9/2017     Sig - Route: Take 2 tablets by mouth Every 4 (Four) Hours As Needed for mild pain (1-3). - Oral    cetirizine (zyrTEC) tablet 10 mg 10 mg Daily 1/9/2017     Sig - Route: Take 1 tablet by mouth Daily. - Oral    desvenlafaxine (PRISTIQ) 24 hr tablet 50 mg 50 mg Daily 1/9/2017     Sig - Route: Take 1 tablet by mouth Daily. - Oral    enoxaparin (LOVENOX) syringe 40 mg 40 mg Every 24 Hours 1/9/2017     Sig - Route: Inject 0.4 mL under the skin Daily. - Subcutaneous    HYDROcodone-acetaminophen (NORCO) 5-325 MG per tablet 1 tablet 1 tablet Every 4 Hours PRN 1/9/2017 1/19/2017    Sig - Route: Take 1 tablet by mouth Every 4 (Four) Hours As Needed for moderate pain (4-6). -  Oral    hyoscyamine (LEVBID) 12 hr tablet 375 mcg 375 mcg Every 12 Hours PRN 1/9/2017     Sig - Route: Take 1 tablet by mouth Every 12 (Twelve) Hours As Needed for cramping. - Oral    iopamidol (ISOVUE-300) 61 % injection 100 mL 100 mL Once in Imaging 1/9/2017 1/9/2017    Sig - Route: Infuse 100 mL into a venous catheter Once. - Intravenous    metroNIDAZOLE (FLAGYL) tablet 500 mg 500 mg Every 8 Hours Scheduled 1/9/2017     Sig - Route: Take 1 tablet by mouth Every 8 (Eight) Hours. - Oral    ondansetron (ZOFRAN) injection 4 mg 4 mg Every 6 Hours PRN 1/9/2017     Sig - Route: Infuse 2 mL into a venous catheter Every 6 (Six) Hours As Needed for nausea or vomiting. - Intravenous    risperiDONE (risperDAL) tablet 2 mg 2 mg Daily 1/9/2017     Sig - Route: Take 1 tablet by mouth Daily. - Oral    simethicone (MYLICON) chewable tablet 80 mg 80 mg 4 Times Daily PRN 1/9/2017     Sig - Route: Chew 1 tablet 4 (Four) Times a Day As Needed for flatulence. - Oral    sodium chloride 0.9 % bolus 500 mL 500 mL Once 1/9/2017 1/9/2017    Sig - Route: Infuse 500 mL into a venous catheter 1 (One) Time. - Intravenous    Cosign for Ordering: Accepted by Syed Sargent MD on 1/9/2017  4:09 AM    sodium chloride 0.9 % flush 1-10 mL 1-10 mL As Needed 1/9/2017     Sig - Route: Infuse 1-10 mL into a venous catheter As Needed for line care. - Intravenous    sodium chloride 0.9 % infusion 125 mL/hr Continuous 1/9/2017     Sig - Route: Infuse 125 mL/hr into a venous catheter Continuous. - Intravenous    levoFLOXacin (LEVAQUIN) 500 mg/100 mL D5W (premix) (LEVAQUIN) 500 mg (Discontinued) 500 mg Every 24 Hours 1/9/2017 1/9/2017    Sig - Route: Infuse 100 mL into a venous catheter Daily. - Intravenous    levoFLOXacin (LEVAQUIN) 750 mg/150 mL D5W (premix) (LEVAQUIN) 750 mg (Discontinued) 750 mg Once 1/9/2017 1/9/2017    Sig - Route: Infuse 150 mL into a venous catheter 1 (One) Time. - Intravenous    Cosign for Ordering: Accepted by Syed Sargent MD  on 1/9/2017  4:09 AM    metroNIDAZOLE (FLAGYL) IVPB 500 mg (Discontinued) 500 mg Once 1/9/2017 1/9/2017    Sig - Route: Infuse 100 mL into a venous catheter 1 (One) Time. - Intravenous    Cosign for Ordering: Accepted by Syed Sargent MD on 1/9/2017  4:09 AM    metroNIDAZOLE (FLAGYL) IVPB 500 mg (Discontinued) 500 mg Every 8 Hours 1/9/2017 1/9/2017    Sig - Route: Infuse 100 mL into a venous catheter Every 8 (Eight) Hours. - Intravenous          Physician Progress Notes (last 24 hours) (Notes from 1/8/2017  2:16 PM through 1/9/2017  2:16 PM)     No notes of this type exist for this encounter.        Consult Notes (last 24 hours) (Notes from 1/8/2017  2:16 PM through 1/9/2017  2:16 PM)     No notes of this type exist for this encounter.        ATTN:  NURSE REVIEW REF#[id number]  PLEASE CALL BACK TO SANGITA [phone] OR [phone]  THANKS!   SANGITA

## 2017-01-09 NOTE — ED NOTES
PT REPORTS DIARRHEA AND BRIGHT RED BLOOD IN STOOL FOR 3 DAYS. PT REPORTS LOWER ABD. PAIN      Kwabena Mclaughlin RN  01/08/17 2847

## 2017-01-09 NOTE — CONSULTS
[facility]  Initial Inpatient Consult Note    Referring Provider: Garfield Holm M.D.    Reason for Consultation: C. difficile colitis, pancreas divisum    Subjective     History of present illness:  She is a 61-year-old white female with a history of irritable bowel syndrome who presented to the hospital with diarrhea and abdominal cramping for 1.5 weeks. 7 days ago she was having several watery stools per day with fecal urgency and nausea. She does report scant bright red blood in the stool on isolated occasions.   A CT scan in the emergency room demonstrated mild colitis of the descending colon and possibly sigmoid colon.  Stool cultures obtained in the emergency room and positive for C. Difficile. She has been recently treated for an upper respiratory infection with Omnicef 2 weeks ago. She is currently experiencing 1-2 loosely formed bowel movements per day. Her abdominal pain and nausea has resolved and she is tolerating a regular diet. . She underwent a colonoscopy to cecum in 2004 by Dr Red which was normal except scattered diverticula.     CT of the abdomen and pelvis in the emergency room also demonstrated pancreatic divisum with pancreatic ducts that are mildly dilated but no obstructing mass noted.  She denies abdominal pain, nausea, or history of pancreatitis. She does smoke 2-3 packs of cigarettes per week and drinks socially. She denies a family history of colon cancer.         Past Medical History:  Past Medical History   Diagnosis Date   • Allergic rhinitis    • Benign essential hypertension 01/06/2015   • Fibromyalgia    • Generalized anxiety disorder 08/24/2012   • H/O exercise stress test 05/05/2005     CVA neg   • Hemorrhoids 01/27/2010   • History of bone density study    • Irritable bowel syndrome (IBS) 11/11/2009   • Major depressive disorder, recurrent episode, in full remission 09/10/2010   • Osteoarthritis, multiple sites 06/12/2014   • Post-menopausal     • Rash and nonspecific skin eruption 07/08/2014     Angular Chelitis   • Schizoaffective disorder    • Schizoaffective disorder, bipolar type    • Sebaceous cyst 10/25/2011   • Sinusitis, acute        Past Surgical History:  History reviewed. No pertinent past surgical history.     Social History:   Social History   Substance Use Topics   • Smoking status: Former Smoker     Packs/day: 0.50     Quit date: 5/2/2016   • Smokeless tobacco: Never Used   • Alcohol use No        Family History:  Family History   Problem Relation Age of Onset   • Colon cancer Other    • Depression Other    • Diabetes Other    • Heart disease Other    • Lung cancer Other        Home Meds:  Prescriptions Prior to Admission   Medication Sig Dispense Refill Last Dose   • alendronate (FOSAMAX) 70 MG tablet Take 1 tablet by mouth every 7 days. For bone (Patient taking differently: Take 70 mg by mouth Every 7 (Seven) Days. For bone. Pt took it last on Friday 1/6/17) 4 tablet 11 Taking   • cefdinir (OMNICEF) 300 MG capsule Two caps PO daily for infection 20 capsule 1 Past Week at Unknown time   • cetirizine (ZyrTEC) 10 MG chewable tablet Chew 10 mg daily.   Taking   • desvenlafaxine (PRISTIQ) 50 MG 24 hr tablet Take 50 mg by mouth Daily.   Past Week at Unknown time   • hyoscyamine (LEVBID) 0.375 MG 12 hr tablet Take 1 tablet by mouth every 12 (twelve) hours as needed for cramping. PRN Abd cramping and IBS 60 tablet 5 1/8/2017 at Unknown time   • lidocaine-hydrocortisone 3-0.5 % cream rectal cream Change order to this dose and still BID prn 85 g 2 Past Week at Unknown time   • Pseudoeph-Doxylamine-DM-APAP (NYQUIL PO) Take  by mouth Daily As Needed.   Taking   • risperiDONE (RisperDAL) 1 MG tablet Take 2 mg by mouth Daily.   Past Week at Unknown time   • gabapentin (NEURONTIN) 100 MG capsule Take two pills three times per day 90 capsule 0 Taking   • MethylPREDNISolone (MEDROL, DAMIAN,) 4 MG tablet follow package directions 21 tablet 0        Current  Meds:     cetirizine 10 mg Oral Daily   desvenlafaxine 50 mg Oral Daily   enoxaparin 40 mg Subcutaneous Q24H   metroNIDAZOLE 500 mg Oral Q8H   risperiDONE 2 mg Oral Daily   saccharomyces boulardii 250 mg Oral BID       Allergies:  No Known Allergies    Review of Systems  10 point review of systems is otherwise negative, pertinent positives are found in the history of present illness or subjective portion of this dictation     Objective     Vital Signs  Temp:  [97 °F (36.1 °C)-98.3 °F (36.8 °C)] 97.1 °F (36.2 °C)  Heart Rate:  [] 100  Resp:  [16-18] 16  BP: (120-163)/(71-97) 120/71    Physical Exam:  General Appearance:    Alert, cooperative, in no acute distress   Head:    Normocephalic, without obvious abnormality, atraumatic   Eyes:            Lids and lashes normal, conjunctivae and sclerae normal, no   icterus   Throat:   No oral lesions, no thrush, oral mucosa moist   Neck:   No adenopathy, supple, trachea midline, no thyromegaly, no   carotid bruit, no JVD   Lungs:     Clear to auscultation,respirations regular, even and                   unlabored    Heart:    Regular rhythm and normal rate, normal S1 and S2, no            murmur, no gallop, no rub, no click   Chest Wall:    No abnormalities observed   Abdomen:     Normal bowel sounds, no masses, no organomegaly, soft        non-tender, non-distended, no guarding, no rebound                 tenderness   Rectal:     Deferred   Extremities:   no edema, no cyanosis, no redness   Skin:   No bleeding, bruising or rash   Lymph nodes:   No palpable adenopathy   Psychiatric:  Judgement and insight: normal   Orientation to person place and time: normal   Mood and affect: normal     Results Review:   I reviewed the patient's new clinical results.      Results from last 7 days  Lab Units 01/09/17  0027   WBC 10*3/mm3 15.43*   HEMOGLOBIN g/dL 12.9   HEMATOCRIT % 41.7   PLATELETS 10*3/mm3 479         Results from last 7 days  Lab Units 01/09/17  0027   SODIUM mmol/L  140   POTASSIUM mmol/L 4.0   CHLORIDE mmol/L 100   TOTAL CO2 mmol/L 26.6   BUN mg/dL 6   CREATININE mg/dL 0.72   CALCIUM mg/dL 9.1   BILIRUBIN mg/dL 0.3   ALK PHOS U/L 67   ALT (SGPT) U/L 13   AST (SGOT) U/L 15   GLUCOSE mg/dL 118*               0  Lab Value Date/Time   LIPASE 20 01/09/2017 0027   LIPASE 20 08/22/2016 1402       Radiology:  Imaging Results (last 72 hours)     Procedure Component Value Units Date/Time    CT Abdomen Pelvis With Contrast [[id number]] Collected:  01/09/17 0147     Updated:  01/09/17 0147    Narrative:       CT ABDOMEN AND PELVIS WITH CONTRAST.     TECHNIQUE: A routine CT scan of the abdomen and pelvis was performed  with coronal and sagittal reconstructed images.     HISTORY: Abdominal pain, left lower quadrant pain, leukocytosis.     COMPARISON: 8/3/2004.     FINDINGS:  Lung bases demonstrate no consolidation or effusion.  0.7 cm nodule in  the right lung base, image 8 series 2. It was not seen on the remote  study of 8/31/2004. Mild bronchiectasis in both lower lobes.      Liver demonstrates homogeneous parenchyma, no definite mass.        Gallbladder does not demonstrate calculi or sludge.  No intra or  extrahepatic biliary ductal dilatation.      The spleen is unremarkable. The pancreas demonstrates disease and of the  pancreatic ducts, the pancreatic duct of Santorini and the pancreatic  duct of [place] open separately into the duodenum. Both ducts are  dilated, the pancreatic duct of [place] is dilated to 4.7 mm, the duct  of [place] is dilated to 5.2 cm. Adrenal glands are within normal  limits.     Kidneys do not demonstrate hydronephrosis or calculi.     Urinary  bladder is unremarkable.      Small bowel loops are within normal limits. There is mild chronic  mucosal thickening of the wall of the ascending colon and transverse  colon, however the wall of the descending colon demonstrates mural  thickening and very minimal surrounding fat stranding, similar changes  are seen in  the sigmoid colon. Moderately large amount of stool is seen  in the colon. Diverticulosis of the colon.         No significant retroperitoneal lymphadenopathy.           Impression:       1.  Mild colitis of the descending colon and possibly the sigmoid colon.  No obstruction, perforation or abscess.   2.  Pancreatic divisum, the pancreatic ducts are slightly dilated, no  obstructing mass is seen, ERCP may be performed.  3.  0.7 cm nodule in the right lung base may be further followed up in 3  months, if available previous examinations may be helpful. The nodule  was not seen on an or study of 8/31/2004.                Assessment/Plan     Principal Problem:    C. difficile colitis  Active Problems:    Generalized anxiety disorder    Benign essential hypertension    Irritable bowel syndrome (IBS)    Schizoaffective disorder, bipolar type    Sepsis    Solitary pulmonary nodule on lung CT    Pancreatic divisum      A/P  1. Cdiff colitis- antibiotics within last month- symptoms improved  2.Pancreatic divisum with dilated pancreatic ducts  3. Overdue for screening colonoscopy  4. Tobacco use    - agree with oral flagyl. Start probiotic. If diarrhea returns can have cholestyramine or imodium in limited amounts.   - follow up colonoscopy in 8/10weeks. Follow up in office 4weeks with APRN. [phone]  - GI soft bland diet  - Discussed with Dr Best-  no indication for ERCP as she has no evidence of pancreatitis. We can discuss diagnosis further in office follow up    I discussed the patients findings and my recommendations with patient    Gerry Oliveros MD  [facility]  01/09/17

## 2017-01-09 NOTE — ED PROVIDER NOTES
EMERGENCY DEPARTMENT ENCOUNTER    CHIEF COMPLAINT  Chief Complaint: diarrhea with bright red blood in stool  History given by: patient  History limited by: nothing   Room Number: 02/02  PMD: Quinton Sin MD    HPI:  Pt is a 61 y.o. female who presents complaining of intermittent diarrhea with bright red blood in stool for 1 week. Pt also complains of intermittent abd pain and intermittent vaginal bleeding despite menopause. Pt states she recently had a sinus infection for which she has been taking abx and stomach flu. Pt takes Hyoscyamine for IBS. Pt has taken Imodium without relief.     Duration:  1 week  Onset: Monday  Timing: intermittent   Location: generalized   Radiation: does not radiate   Quality: new  Intensity/Severity: moderate   Progression: unchanged   Associated Symptoms: diarrhea, blood in stool, abd pain, vaginal bleeding   Aggravating Factors: none specified   Alleviating Factors: none specified   Previous Episodes: h/o IBS  Treatment before arrival: antibiotic, Imodium     PAST MEDICAL HISTORY  Active Ambulatory Problems     Diagnosis Date Noted   • Allergic rhinitis    • Fibromyalgia    • Generalized anxiety disorder 08/24/2012   • Hemorrhoids 01/27/2010   • Benign essential hypertension 01/06/2015   • Irritable bowel syndrome (IBS)    • Major depressive disorder, recurrent episode, in full remission 09/10/2010   • Osteoarthritis, multiple sites 06/12/2014   • Post-menopausal    • Rash and nonspecific skin eruption 07/08/2014   • Sebaceous cyst 10/25/2011   • Sinusitis, acute    • Schizoaffective disorder, bipolar type 03/17/2016   • Hyperglycemia 03/18/2016   • Tobacco abuse 03/18/2016   • HTN (hypertension) 03/18/2016   • Peripheral neuropathic pain 06/22/2016   • Urinary tract infection without hematuria 08/24/2016     Resolved Ambulatory Problems     Diagnosis Date Noted   • No Resolved Ambulatory Problems     Past Medical History   Diagnosis Date   • H/O exercise stress test 05/05/2005   •  History of bone density study    • Schizoaffective disorder        PAST SURGICAL HISTORY  History reviewed. No pertinent past surgical history.    FAMILY HISTORY  Family History   Problem Relation Age of Onset   • Colon cancer Other    • Depression Other    • Diabetes Other    • Heart disease Other    • Lung cancer Other        SOCIAL HISTORY  Social History     Social History   • Marital status:      Spouse name: N/A   • Number of children: N/A   • Years of education: N/A     Occupational History   • Not on file.     Social History Main Topics   • Smoking status: Former Smoker     Packs/day: 0.50     Quit date: 5/2/2016   • Smokeless tobacco: Never Used   • Alcohol use No   • Drug use: No   • Sexual activity: Yes     Partners: Male     Other Topics Concern   • Not on file     Social History Narrative       ALLERGIES  Review of patient's allergies indicates no known allergies.    REVIEW OF SYSTEMS  Review of Systems   Constitutional: Negative for fever.   HENT: Negative for sore throat.    Eyes: Negative.    Respiratory: Negative for cough and shortness of breath.    Cardiovascular: Negative for chest pain.   Gastrointestinal: Positive for abdominal pain, blood in stool and diarrhea. Negative for vomiting.   Genitourinary: Positive for vaginal bleeding. Negative for dysuria.   Musculoskeletal: Negative for neck pain.   Skin: Negative for rash.   Allergic/Immunologic: Negative.    Neurological: Negative for weakness, numbness and headaches.   Hematological: Negative.    Psychiatric/Behavioral: Negative.    All other systems reviewed and are negative.      PHYSICAL EXAM  ED Triage Vitals   Temp Heart Rate Resp BP SpO2   01/08/17 2229 01/08/17 2229 01/08/17 2229 01/08/17 2231 01/08/17 2229   98.3 °F (36.8 °C) 103 18 157/90 90 %      Temp src Heart Rate Source Patient Position BP Location FiO2 (%)   01/08/17 2229 01/08/17 2229 01/08/17 2231 -- --   Tympanic Monitor Sitting         Physical Exam   Constitutional:  She is oriented to person, place, and time and well-developed, well-nourished, and in no distress. No distress.   HENT:   Head: Normocephalic and atraumatic.   Eyes: EOM are normal. Pupils are equal, round, and reactive to light.   Neck: Normal range of motion. Neck supple.   Cardiovascular: Normal rate, regular rhythm and normal heart sounds.    Pulmonary/Chest: Effort normal and breath sounds normal. No respiratory distress.   Abdominal: Soft. There is tenderness in the left lower quadrant. There is no rebound and no guarding.   Musculoskeletal: Normal range of motion. She exhibits no edema.   Neurological: She is alert and oriented to person, place, and time. She has normal sensation and normal strength.   Skin: Skin is warm and dry. No rash noted.   Psychiatric: Mood and affect normal.   Nursing note and vitals reviewed.      LAB RESULTS  Lab Results (last 24 hours)     Procedure Component Value Units Date/Time    CBC & Differential [[id number]] Collected:  01/09/17 0027    Specimen:  Blood Updated:  01/09/17 0040    Narrative:       The following orders were created for panel order CBC & Differential.  Procedure                               Abnormality         Status                     ---------                               -----------         ------                     CBC Auto Differential[[id number]]         Abnormal            Final result                 Please view results for these tests on the individual orders.    Comprehensive Metabolic Panel [[id number]]  (Abnormal) Collected:  01/09/17 0027    Specimen:  Blood Updated:  01/09/17 0116     Glucose 118 (H) mg/dL      BUN 6 mg/dL      Creatinine 0.72 mg/dL      Sodium 140 mmol/L      Potassium 4.0 mmol/L      Chloride 100 mmol/L      CO2 26.6 mmol/L      Calcium 9.1 mg/dL      Total Protein 6.7 g/dL      Albumin 4.00 g/dL      ALT (SGPT) 13 U/L      AST (SGOT) 15 U/L      Alkaline Phosphatase 67 U/L      Total Bilirubin 0.3 mg/dL      eGFR Non  Amer  83 mL/min/1.73      Globulin 2.7 gm/dL      A/G Ratio 1.5 g/dL      BUN/Creatinine Ratio 8.3      Anion Gap 13.4 mmol/L     Lipase [[id number]]  (Normal) Collected:  01/09/17 0027    Specimen:  Blood Updated:  01/09/17 0116     Lipase 20 U/L     CBC Auto Differential [[id number]]  (Abnormal) Collected:  01/09/17 0027    Specimen:  Blood Updated:  01/09/17 0040     WBC 15.43 (H) 10*3/mm3      RBC 4.33 10*6/mm3      Hemoglobin 12.9 g/dL      Hematocrit 41.7 %      MCV 96.3 fL      MCH 29.8 pg      MCHC 30.9 (L) g/dL      RDW 13.2 (H) %      RDW-SD 46.0 fl      MPV 9.4 fL      Platelets 479 10*3/mm3      Neutrophil % 80.4 (H) %      Lymphocyte % 13.0 (L) %      Monocyte % 5.2 %      Eosinophil % 0.7 %      Basophil % 0.4 %      Immature Grans % 0.3 %      Neutrophils, Absolute 12.40 (H) 10*3/mm3      Lymphocytes, Absolute 2.01 10*3/mm3      Monocytes, Absolute 0.81 10*3/mm3      Eosinophils, Absolute 0.11 10*3/mm3      Basophils, Absolute 0.06 10*3/mm3      Immature Grans, Absolute 0.04 (H) 10*3/mm3     Urinalysis With / Culture If Indicated [[id number]]  (Abnormal) Collected:  01/09/17 0304    Specimen:  Urine from Urine, Clean Catch Updated:  01/09/17 0320     Color, UA Yellow      Appearance, UA Clear      pH, UA 6.5      Specific Gravity, UA >=1.030      Glucose, UA Negative      Ketones, UA Negative      Bilirubin, UA Negative      Blood, UA Trace (A)      Protein, UA Negative      Leuk Esterase, UA Negative      Nitrite, UA Negative      Urobilinogen, UA 0.2 E.U./dL     Urinalysis, Microscopic Only [[id number]]  (Abnormal) Collected:  01/09/17 0304    Specimen:  Urine from Urine, Clean Catch Updated:  01/09/17 0333     RBC, UA 0-2 (A) /HPF      WBC, UA 0-2 (A) /HPF      Bacteria, UA None Seen /HPF      Squamous Epithelial Cells, UA 3-6 (A) /HPF      Hyaline Casts, UA None Seen /LPF      Methodology Manual Light Microscopy           I ordered the above labs and reviewed the results    RADIOLOGY  CT Abdomen Pelvis With  Contrast   Preliminary Result   1.  Mild colitis of the descending colon and possibly the sigmoid colon.   No obstruction, perforation or abscess.    2.  Pancreatic divisum, the pancreatic ducts are slightly dilated, no   obstructing mass is seen, ERCP may be performed.   3.  0.7 cm nodule in the right lung base may be further followed up in 3   months, if available previous examinations may be helpful. The nodule   was not seen on an or study of 8/31/2004.                 I ordered the above noted radiological studies. Interpreted by radiologist. Reviewed by me in PACS.       PROCEDURES  Procedures      PROGRESS AND CONSULTS  ED Course       2358: Ordered labs for further evaluation.     0105: Ordered CT abd/pelvis.    0117: Rechecked pt. Pt is resting comfortably. Discussed plan to order CT abd/pelvis due to elevated WBC.     0248: Discussed pt's case with Dr. Sargent, who agrees with plan for care.     0312: Discussed pt's case with Dr. Durham (internal medicine), who agrees to admit pt. He requests that I start pt on Levaquin and Flagyl.     MEDICAL DECISION MAKING  Results were reviewed/discussed with the patient and they were also made aware of online access. Pt also made aware that some labs, such as cultures, will not be resulted during ER visit and follow up with PMD is necessary.     MDM  Number of Diagnoses or Management Options     Amount and/or Complexity of Data Reviewed  Clinical lab tests: reviewed and ordered  Tests in the radiology section of CPT®: ordered and reviewed  Decide to obtain previous medical records or to obtain history from someone other than the patient: yes  Discuss the patient with other providers: yes    Patient Progress  Patient progress: stable       DIAGNOSIS  Final diagnoses:   Colitis   Leukocytosis, unspecified type   Abnormal abdominal CT scan (pancreatic duct dilitation)       DISPOSITION  ADMISSION    Discussed treatment plan and reason for admission with pt/family and  admitting physician.  Pt/family voiced understanding of the plan for admission for further testing/treatment as needed.     Latest Documented Vital Signs:  As of 3:54 AM  BP- 159/90 HR- 81 Temp- 98.3 °F (36.8 °C) (Tympanic) O2 sat- 93%    --  Documentation assistance provided by andrew Romero for Iker Arroyo.  Information recorded by the scribe was done at my direction and has been verified and validated               Maricel Romero  01/09/17 0326       STELLA Reid III  01/09/17 0354

## 2017-01-09 NOTE — ED NOTES
"This RN attempted to place IV at this time during blood collection; pt yelled out \"i don't think this has ever hurt this bad!\". Pt explained IV access may be needed in case IV fluids are ordered. RN offered to take out IV and pt accepted. IV removed without signs of irritation- site was not infiltrated, no redness noted, no active bleeding, and catheter end intact with removal. Pt given ice pack and reassurance given. STELLA Arroyo notified, no new orders received.      Cristina Rose RN  01/09/17 0034    "

## 2017-01-09 NOTE — H&P
Name: Elif Cardona ADMIT: 2017   : 1955  PCP: Quinton Sin MD    MRN: [id number] LOS: 0 days   AGE/SEX: 61 y.o. female  ROOM: [facility]/     Chief Complaint   Patient presents with   • Diarrhea       Subjective   Patient is a 61 y.o. female presents with the following...    History of Present Illness  The patient is a 61-year-old white male with a previous history for hypertension, schizoaffective disorder, IBS who presented to the hospital for diarrhea.  3-4 days prior to admission she started with what she thought was a stomach flu with nausea, vomiting and diarrhea.  At the current time she actually complains of stomach gas.  Diarrhea has improved overall.  A CT of this can of the abdomen in the emergency room showed colitis and she was admitted to our service.  She has not had any sick contacts but has had recent antibiotics 2 weeks ago with Omnicef for an upper respiratory infection.  She has had some bright red blood per rectum due to this illness as well.  She was concerned about all of the above and came to the ER.  According to emergency room notes the patient has had vaginal bleeding despite menopause but upon further questioning the patient says she thinks it was from her rectum rather than her vagina.  In addition, the CAT scan showed pancreatic divisum with dilation of her pancreatic ducts but there were no masses.  Patient does not have any history of pancreatitis there is no history of pancreatic cancer.  She denies any abdominal pain or nausea at the current time.  Past Medical History   Diagnosis Date   • Allergic rhinitis    • Benign essential hypertension 2015   • Fibromyalgia    • Generalized anxiety disorder 2012   • H/O exercise stress test 2005     CVA neg   • Hemorrhoids 2010   • History of bone density study    • Irritable bowel syndrome (IBS) 2009   • Major depressive disorder, recurrent episode, in full remission 09/10/2010   • Osteoarthritis,  multiple sites 06/12/2014   • Post-menopausal    • Rash and nonspecific skin eruption 07/08/2014     Angular Chelitis   • Schizoaffective disorder    • Schizoaffective disorder, bipolar type    • Sebaceous cyst 10/25/2011   • Sinusitis, acute      History reviewed. No pertinent past surgical history.  Family History   Problem Relation Age of Onset   • Colon cancer Other    • Depression Other    • Diabetes Other    • Heart disease Other    • Lung cancer Other      Social History   Substance Use Topics   • Smoking status: Former Smoker     Packs/day: 0.50     Quit date: 5/2/2016   • Smokeless tobacco: Never Used   • Alcohol use No     Prescriptions Prior to Admission   Medication Sig Dispense Refill Last Dose   • alendronate (FOSAMAX) 70 MG tablet Take 1 tablet by mouth every 7 days. For bone (Patient taking differently: Take 70 mg by mouth Every 7 (Seven) Days. For bone. Pt took it last on Friday 1/6/17) 4 tablet 11 Taking   • cefdinir (OMNICEF) 300 MG capsule Two caps PO daily for infection 20 capsule 1 Past Week at Unknown time   • cetirizine (ZyrTEC) 10 MG chewable tablet Chew 10 mg daily.   Taking   • desvenlafaxine (PRISTIQ) 50 MG 24 hr tablet Take 50 mg by mouth Daily.   Past Week at Unknown time   • hyoscyamine (LEVBID) 0.375 MG 12 hr tablet Take 1 tablet by mouth every 12 (twelve) hours as needed for cramping. PRN Abd cramping and IBS 60 tablet 5 1/8/2017 at Unknown time   • lidocaine-hydrocortisone 3-0.5 % cream rectal cream Change order to this dose and still BID prn 85 g 2 Past Week at Unknown time   • Pseudoeph-Doxylamine-DM-APAP (NYQUIL PO) Take  by mouth Daily As Needed.   Taking   • risperiDONE (RisperDAL) 1 MG tablet Take 2 mg by mouth Daily.   Past Week at Unknown time   • gabapentin (NEURONTIN) 100 MG capsule Take two pills three times per day 90 capsule 0 Taking   • MethylPREDNISolone (MEDROL, DAMIAN,) 4 MG tablet follow package directions 21 tablet 0      Allergies:  Review of patient's allergies  indicates no known allergies.    Review of Systems   Constitutional: Positive for appetite change and fatigue. Negative for activity change, fever and unexpected weight change.   HENT: Negative for mouth sores, sore throat and trouble swallowing.    Eyes: Negative for pain and visual disturbance.   Respiratory: Negative for cough, chest tightness and shortness of breath.    Cardiovascular: Negative for chest pain, palpitations and leg swelling.   Gastrointestinal: Positive for blood in stool and diarrhea. Negative for abdominal pain, constipation, nausea and vomiting.   Endocrine:        Negative for Diabetes or thyroid disease   Genitourinary: Negative for difficulty urinating, hematuria, menstrual problem and vaginal bleeding.   Musculoskeletal: Negative.  Negative for back pain, neck pain and neck stiffness.   Skin: Negative for rash and wound.   Neurological: Negative for dizziness, syncope, weakness, light-headedness and headaches.   Hematological: Negative for adenopathy. Does not bruise/bleed easily.   Psychiatric/Behavioral: Negative for agitation, behavioral problems and confusion.        Objective    Vital Signs  Temp:  [97 °F (36.1 °C)-98.3 °F (36.8 °C)] 98.1 °F (36.7 °C)  Heart Rate:  [] 90  Resp:  [16-18] 16  BP: (151-163)/(81-97) 155/97  SpO2:  [90 %-96 %] 96 %  on   ;   O2 Device: room air  Body mass index is 25.75 kg/(m^2).    Physical Exam   Constitutional: She is oriented to person, place, and time. She appears well-developed and well-nourished. No distress.   HENT:   Head: Normocephalic and atraumatic.   Mouth/Throat: Oropharynx is clear and moist. No oropharyngeal exudate.   Eyes: Conjunctivae and EOM are normal. Pupils are equal, round, and reactive to light. No scleral icterus.   Neck: Normal range of motion. Neck supple. No JVD present. No thyromegaly present.   Cardiovascular: Normal rate, regular rhythm and normal heart sounds.  Exam reveals no gallop and no friction rub.    No murmur  heard.  Pulmonary/Chest: Effort normal and breath sounds normal. No stridor. No respiratory distress.   Abdominal: Soft. Bowel sounds are normal. She exhibits distension. There is no tenderness. There is no rebound and no guarding.   Tympanic.  Patient says abdomen is not more distended than what it usually is and there is no tenderness, guarding or rebound   Musculoskeletal: She exhibits no edema or tenderness.   Lymphadenopathy:     She has no cervical adenopathy.   Neurological: She is alert and oriented to person, place, and time. No cranial nerve deficit.   Skin: Skin is warm and dry. She is not diaphoretic.   Psychiatric: She has a normal mood and affect. Her behavior is normal.       Results Review:   I reviewed the patient's new clinical results.  Reviewed the CT of the abdomen and she does have mild thickening of her colon  Imaging Results (last 24 hours)     Procedure Component Value Units Date/Time    CT Abdomen Pelvis With Contrast [[id number]] Collected:  01/09/17 0147     Updated:  01/09/17 0147    Narrative:       CT ABDOMEN AND PELVIS WITH CONTRAST.     TECHNIQUE: A routine CT scan of the abdomen and pelvis was performed  with coronal and sagittal reconstructed images.     HISTORY: Abdominal pain, left lower quadrant pain, leukocytosis.     COMPARISON: 8/3/2004.     FINDINGS:  Lung bases demonstrate no consolidation or effusion.  0.7 cm nodule in  the right lung base, image 8 series 2. It was not seen on the remote  study of 8/31/2004. Mild bronchiectasis in both lower lobes.      Liver demonstrates homogeneous parenchyma, no definite mass.        Gallbladder does not demonstrate calculi or sludge.  No intra or  extrahepatic biliary ductal dilatation.      The spleen is unremarkable. The pancreas demonstrates disease and of the  pancreatic ducts, the pancreatic duct of [place]or[place] and the pancreatic  duct of [place] open separately into the duodenum. Both ducts are  dilated, the pancreatic duct of  S[place] is dilated to 4.7 mm, the duct  of [place] is dilated to 5.2 cm. Adrenal glands are within normal  limits.     Kidneys do not demonstrate hydronephrosis or calculi.     Urinary  bladder is unremarkable.      Small bowel loops are within normal limits. There is mild chronic  mucosal thickening of the wall of the ascending colon and transverse  colon, however the wall of the descending colon demonstrates mural  thickening and very minimal surrounding fat stranding, similar changes  are seen in the sigmoid colon. Moderately large amount of stool is seen  in the colon. Diverticulosis of the colon.         No significant retroperitoneal lymphadenopathy.           Impression:       1.  Mild colitis of the descending colon and possibly the sigmoid colon.  No obstruction, perforation or abscess.   2.  Pancreatic divisum, the pancreatic ducts are slightly dilated, no  obstructing mass is seen, ERCP may be performed.  3.  0.7 cm nodule in the right lung base may be further followed up in 3  months, if available previous examinations may be helpful. The nodule  was not seen on an or study of 8/31/2004.                Assessment/Plan     Principal Problem:    C. difficile colitis  Active Problems:    Generalized anxiety disorder    Benign essential hypertension    Irritable bowel syndrome (IBS)    Schizoaffective disorder, bipolar type    Sepsis    Solitary pulmonary nodule on lung CT    Pancreatic divisum      Assessment & Plan    C. difficile colitis: Risk factor was antibiotics 2 weeks ago.  Stool cultures obtained in the emergency room just resulted with positive  for C. difficile.  I will discontinue Levaquin and change IV Flagyl to oral Flagyl.  Continue supportive care with IV fluids and advance diet as tolerated.  Given flatulence no added simethicone.  Her abdomen is tympanic.  Patient says her abdomen is not distended more than normal but if it does appear more distended tomorrow would consider  x-ray    Sepsis: Heart rate over 3 and a blood cell count 15,000 with C. difficile.  Continue antibiotics and supportive care     Pancreatic divisum with dilated pancreatic ducts: Could just be related to pancreatic divisum.  No signs of pancreatitis, will check lipase in the morning though.  Would like to consult GI to discuss the possibility of ERCP for further evaluation of the dilated ducts.    Hypertension: Reported history of hypertension but does not take any medications.  Observe for now.    Solitary pulmonary lung nodule in right lung base: 0.7 cm.  We'll repeat CT of the chest in 3 months    Schizoaffective disorder: Continue home medications    DVT prophylaxis with Lovenox    I discussed CODE STATUS with the patient and she is a full code.  Her healthcare surrogate is her     I discussed the patients findings and my recommendations with patient and nursing staff.          Garfield Holm MD  [facility]  01/09/17  10:41 AM

## 2017-01-09 NOTE — PLAN OF CARE
Problem: Patient Care Overview (Adult)  Goal: Plan of Care Review  Outcome: Ongoing (interventions implemented as appropriate)    01/09/17 0525   Coping/Psychosocial Response Interventions   Plan Of Care Reviewed With patient   Patient Care Overview   Progress no change   Outcome Evaluation   Outcome Summary/Follow up Plan adm this am with dx of colitis. placed in contact spore isolation for stool testing for c-diff. bolus of ivfs per order. waiting for pharmacy to send antbx to start. Apparently pt was argumentative and uncooperative in ER but so far cooperative here. pt does have pysch hx       Goal: Adult Individualization and Mutuality  Outcome: Ongoing (interventions implemented as appropriate)  Goal: Discharge Needs Assessment  Outcome: Ongoing (interventions implemented as appropriate)    Problem: Infection, Risk/Actual (Adult)  Goal: Identify Related Risk Factors and Signs and Symptoms  Outcome: Outcome(s) achieved Date Met:  01/09/17  Goal: Infection Prevention/Resolution  Outcome: Ongoing (interventions implemented as appropriate)

## 2017-01-09 NOTE — ED NOTES
"Pt attempted to provide urine sample at this time, she reports \"i wiped but then forgot to place the cup under there.\" pt explained need for sample for dispo from CELIO Rose RN  01/09/17 0105    "

## 2017-01-09 NOTE — ED PROVIDER NOTES
History   Pt presents to the ED with diarrhea and lower abd pain that has been intermittent for the past week. Pt reports recent abx use secondary to a sinus infection.     Exam  Abd is soft with minimal RLQ and LLQ tenderness    Course  Pt will be admitted.    Attestation:  I supervised care provided by the midlevel provider.    We have discussed this patient's history, physical exam, and treatment plan.    I have reviewed the note and personally saw and examined the patient and agree with the plan of care.  I agree with the midlevel provider's findings and plan. I reviewed the midlevel providers note.     Documentation assistance provided by andrew Cummings for Dr Sargent Information recorded by the anrdew was done at my direction and has been verified and validated by me.       Kirsten Cummings  01/09/17 0257       Syed Sargent MD  01/09/17 0413

## 2017-01-10 VITALS
HEIGHT: 64 IN | TEMPERATURE: 97.3 F | OXYGEN SATURATION: 94 % | RESPIRATION RATE: 16 BRPM | WEIGHT: 150 LBS | DIASTOLIC BLOOD PRESSURE: 86 MMHG | HEART RATE: 84 BPM | BODY MASS INDEX: 25.61 KG/M2 | SYSTOLIC BLOOD PRESSURE: 148 MMHG

## 2017-01-10 LAB
ANION GAP SERPL CALCULATED.3IONS-SCNC: 13.4 MMOL/L
BASOPHILS # BLD AUTO: 0.04 10*3/MM3 (ref 0–0.2)
BASOPHILS NFR BLD AUTO: 0.5 % (ref 0–1.5)
BUN BLD-MCNC: 5 MG/DL (ref 6–20)
BUN/CREAT SERPL: 7.4 (ref 7–25)
CALCIUM SPEC-SCNC: 8.2 MG/DL (ref 8.6–10.5)
CHLORIDE SERPL-SCNC: 105 MMOL/L (ref 98–107)
CO2 SERPL-SCNC: 22.6 MMOL/L (ref 22–29)
CREAT BLD-MCNC: 0.68 MG/DL (ref 0.57–1)
DEPRECATED RDW RBC AUTO: 46.1 FL (ref 37–54)
EOSINOPHIL # BLD AUTO: 0.19 10*3/MM3 (ref 0–0.7)
EOSINOPHIL NFR BLD AUTO: 2.5 % (ref 0.3–6.2)
ERYTHROCYTE [DISTWIDTH] IN BLOOD BY AUTOMATED COUNT: 13.2 % (ref 11.7–13)
GFR SERPL CREATININE-BSD FRML MDRD: 89 ML/MIN/1.73
GLUCOSE BLD-MCNC: 196 MG/DL (ref 65–99)
HCT VFR BLD AUTO: 35.1 % (ref 35.6–45.5)
HGB BLD-MCNC: 11 G/DL (ref 11.9–15.5)
IMM GRANULOCYTES # BLD: 0.02 10*3/MM3 (ref 0–0.03)
IMM GRANULOCYTES NFR BLD: 0.3 % (ref 0–0.5)
LIPASE SERPL-CCNC: 25 U/L (ref 13–60)
LYMPHOCYTES # BLD AUTO: 1.76 10*3/MM3 (ref 0.9–4.8)
LYMPHOCYTES NFR BLD AUTO: 23.3 % (ref 19.6–45.3)
MCH RBC QN AUTO: 30.2 PG (ref 26.9–32)
MCHC RBC AUTO-ENTMCNC: 31.3 G/DL (ref 32.4–36.3)
MCV RBC AUTO: 96.4 FL (ref 80.5–98.2)
MONOCYTES # BLD AUTO: 0.51 10*3/MM3 (ref 0.2–1.2)
MONOCYTES NFR BLD AUTO: 6.8 % (ref 5–12)
NEUTROPHILS # BLD AUTO: 5.03 10*3/MM3 (ref 1.9–8.1)
NEUTROPHILS NFR BLD AUTO: 66.6 % (ref 42.7–76)
PLATELET # BLD AUTO: 360 10*3/MM3 (ref 140–500)
PMV BLD AUTO: 9.5 FL (ref 6–12)
POTASSIUM BLD-SCNC: 3.5 MMOL/L (ref 3.5–5.2)
RBC # BLD AUTO: 3.64 10*6/MM3 (ref 3.9–5.2)
SODIUM BLD-SCNC: 141 MMOL/L (ref 136–145)
WBC NRBC COR # BLD: 7.55 10*3/MM3 (ref 4.5–10.7)

## 2017-01-10 PROCEDURE — 99231 SBSQ HOSP IP/OBS SF/LOW 25: CPT | Performed by: INTERNAL MEDICINE

## 2017-01-10 PROCEDURE — 83690 ASSAY OF LIPASE: CPT | Performed by: INTERNAL MEDICINE

## 2017-01-10 PROCEDURE — 85025 COMPLETE CBC W/AUTO DIFF WBC: CPT | Performed by: INTERNAL MEDICINE

## 2017-01-10 PROCEDURE — 80048 BASIC METABOLIC PNL TOTAL CA: CPT | Performed by: INTERNAL MEDICINE

## 2017-01-10 RX ORDER — SIMETHICONE 80 MG
80 TABLET,CHEWABLE ORAL 4 TIMES DAILY PRN
Qty: 30 TABLET | Refills: 0 | Status: SHIPPED | OUTPATIENT
Start: 2017-01-10 | End: 2019-05-10 | Stop reason: SDDI

## 2017-01-10 RX ORDER — METRONIDAZOLE 500 MG/1
500 TABLET ORAL EVERY 8 HOURS SCHEDULED
Qty: 30 TABLET | Refills: 0 | Status: SHIPPED | OUTPATIENT
Start: 2017-01-10 | End: 2017-01-20

## 2017-01-10 RX ADMIN — SODIUM CHLORIDE 125 ML/HR: 9 INJECTION, SOLUTION INTRAVENOUS at 01:34

## 2017-01-10 RX ADMIN — METRONIDAZOLE 500 MG: 500 TABLET ORAL at 05:39

## 2017-01-10 RX ADMIN — HYDROCODONE BITARTRATE AND ACETAMINOPHEN 1 TABLET: 5; 325 TABLET ORAL at 05:39

## 2017-01-10 RX ADMIN — CETIRIZINE HYDROCHLORIDE 10 MG: 10 TABLET, FILM COATED ORAL at 08:16

## 2017-01-10 RX ADMIN — HYDROCODONE BITARTRATE AND ACETAMINOPHEN 1 TABLET: 5; 325 TABLET ORAL at 12:08

## 2017-01-10 RX ADMIN — Medication 250 MG: at 08:16

## 2017-01-10 RX ADMIN — DESVENLAFAXINE SUCCINATE 50 MG: 50 TABLET, EXTENDED RELEASE ORAL at 08:16

## 2017-01-10 RX ADMIN — RISPERIDONE 2 MG: 2 TABLET, FILM COATED ORAL at 08:16

## 2017-01-10 RX ADMIN — HYDROCODONE BITARTRATE AND ACETAMINOPHEN 1 TABLET: 5; 325 TABLET ORAL at 01:34

## 2017-01-10 NOTE — PLAN OF CARE
Problem: Patient Care Overview (Adult)  Goal: Plan of Care Review  Outcome: Outcome(s) achieved Date Met:  01/10/17    01/10/17 1147   Coping/Psychosocial Response Interventions   Plan Of Care Reviewed With patient   Patient Care Overview   Progress improving   Outcome Evaluation   Outcome Summary/Follow up Plan Amt in herring and room often. States stools are less frequent, less cramping. Cont abx per order. No c./o pain or nausea/       Goal: Adult Individualization and Mutuality  Outcome: Outcome(s) achieved Date Met:  01/10/17  Goal: Discharge Needs Assessment  Outcome: Outcome(s) achieved Date Met:  01/10/17

## 2017-01-10 NOTE — PROGRESS NOTES
[facility]  Inpatient Progress Note    Reason for Follow Up: c dif colitis, pancreas divisum    Subjective     Interval History:      No Complaints today, minimal diarrhea and no abdominal pain would like to be discharged home     Current Facility-Administered Medications:   •  acetaminophen (TYLENOL) tablet 650 mg, 650 mg, Oral, Q4H PRN, Gafrield Holm MD  •  cetirizine (zyrTEC) tablet 10 mg, 10 mg, Oral, Daily, Garfield Holm MD, 10 mg at 01/10/17 0816  •  desvenlafaxine (PRISTIQ) 24 hr tablet 50 mg, 50 mg, Oral, Daily, Garfield Holm MD, 50 mg at 01/10/17 0816  •  enoxaparin (LOVENOX) syringe 40 mg, 40 mg, Subcutaneous, Q24H, Garfield Holm MD, 40 mg at 01/09/17 1041  •  HYDROcodone-acetaminophen (NORCO) 5-325 MG per tablet 1 tablet, 1 tablet, Oral, Q4H PRN, Garfield Holm MD, 1 tablet at 01/10/17 0539  •  hyoscyamine (LEVBID) 12 hr tablet 375 mcg, 375 mcg, Oral, Q12H PRN, Garfield Holm MD  •  metroNIDAZOLE (FLAGYL) tablet 500 mg, 500 mg, Oral, Q8H, Garfield Holm MD, 500 mg at 01/10/17 0539  •  ondansetron (ZOFRAN) injection 4 mg, 4 mg, Intravenous, Q6H PRN, Garfield Holm MD  •  risperiDONE (risperDAL) tablet 2 mg, 2 mg, Oral, Daily, Garfield Holm MD, 2 mg at 01/10/17 0816  •  saccharomyces boulardii (FLORASTOR) capsule 250 mg, 250 mg, Oral, BID, CHRISTINA Pearson, 250 mg at 01/10/17 0816  •  simethicone (MYLICON) chewable tablet 80 mg, 80 mg, Oral, 4x Daily PRN, Garfield Holm MD  •  sodium chloride 0.9 % flush 1-10 mL, 1-10 mL, Intravenous, PRN, Garfield Holm MD  •  sodium chloride 0.9 % infusion, 125 mL/hr, Intravenous, Continuous, Carrie Durham MD, Last Rate: 125 mL/hr at 01/10/17 0134, 125 mL/hr at 01/10/17 0134  Review of Systems:    10 point review of systems is otherwise negative, pertinent positives are found in the history of present illness or subjective portion of this  dictation    Objective     Vital Signs  Temp:  [97.1 °F (36.2 °C)-98.4 °F (36.9 °C)] 97.3 °F (36.3 °C)  Heart Rate:  [] 84  Resp:  [16] 16  BP: (120-148)/(71-86) 148/86  Body mass index is 25.75 kg/(m^2).    Intake/Output Summary (Last 24 hours) at 01/10/17 1038  Last data filed at 01/10/17 0900   Gross per 24 hour   Intake 2729.17 ml   Output      0 ml   Net 2729.17 ml     I/O this shift:  In: 240 [P.O.:240]  Out: -        Physical Exam:   General: patient awake, alert and cooperative   Eyes: Normal lids and lashes, no scleral icterus   Neck: supple, normal ROM   Skin: warm and dry, not jaundiced   Cardiovascular: regular rhythm and rate, no murmurs auscultated   Pulm: clear to auscultation bilaterally, regular and unlabored   Abdomen: soft, nontender, nondistended; normal bowel sounds   Rectal: deferred   Extremities: no rash or edema   Psychiatric: Normal mood and behavior; memory intact     Results Review:     I reviewed the patient's new clinical results.      Results from last 7 days  Lab Units 01/10/17  0537 01/09/17  0027   WBC 10*3/mm3 7.55 15.43*   HEMOGLOBIN g/dL 11.0* 12.9   HEMATOCRIT % 35.1* 41.7   PLATELETS 10*3/mm3 360 479         Results from last 7 days  Lab Units 01/10/17  0537 01/09/17  0027   SODIUM mmol/L 141 140   POTASSIUM mmol/L 3.5 4.0   CHLORIDE mmol/L 105 100   TOTAL CO2 mmol/L 22.6 26.6   BUN mg/dL 5* 6   CREATININE mg/dL 0.68 0.72   CALCIUM mg/dL 8.2* 9.1   BILIRUBIN mg/dL  --  0.3   ALK PHOS U/L  --  67   ALT (SGPT) U/L  --  13   AST (SGOT) U/L  --  15   GLUCOSE mg/dL 196* 118*               0  Lab Value Date/Time   LIPASE 25 01/10/2017 0537   LIPASE 20 01/09/2017 0027   LIPASE 20 08/22/2016 1402       Radiology:    Imaging Results (last 24 hours)     ** No results found for the last 24 hours. **            Assessment/Plan     Patient Active Problem List   Diagnosis Code   • Allergic rhinitis J30.9   • Fibromyalgia M79.7   • Generalized anxiety disorder F41.1   • Hemorrhoids  K64.9   • Benign essential hypertension I10   • Irritable bowel syndrome (IBS) K58.9   • Major depressive disorder, recurrent episode, in full remission F33.42   • Osteoarthritis, multiple sites M15.9   • Post-menopausal Z78.0   • Rash and nonspecific skin eruption R21   • Sebaceous cyst L72.3   • Sinusitis, acute J01.90   • Schizoaffective disorder, bipolar type F25.0   • Hyperglycemia R73.9   • Tobacco abuse Z72.0   • HTN (hypertension) I10   • Peripheral neuropathic pain G62.9   • Urinary tract infection without hematuria N39.0   • C. difficile colitis A04.7   • Sepsis A41.9   • Solitary pulmonary nodule on lung CT R91.1   • Pancreatic divisum Q45.3       A/P  1. Cdiff colitis- antibiotics within last month- symptoms improved  2.Pancreatic divisum with dilated pancreatic ducts  3. Overdue for screening colonoscopy  4. Tobacco use     - agree with oral flagyl. Start probiotic. If diarrhea returns can have cholestyramine or imodium in limited amounts.   - follow up colonoscopy in 8/10weeks. Follow up in office 4weeks with APRN. [phone]  - GI soft bland diet  - Discussed with Dr Best- no indication for ERCP as she has no evidence of pancreatitis. We can discuss diagnosis further in office follow up    Agree with discharge home per primary team and we will see as needed     I discussed the patients findings and my recommendations with patient        Gerry Oliveros MD  01/10/17  10:38 AM

## 2017-01-10 NOTE — PLAN OF CARE
Problem: Patient Care Overview (Adult)  Goal: Plan of Care Review  Outcome: Ongoing (interventions implemented as appropriate)    01/10/17 0341   Coping/Psychosocial Response Interventions   Plan Of Care Reviewed With patient   Patient Care Overview   Progress improving   Outcome Evaluation   Outcome Summary/Follow up Plan States stool are loose but not watery. pt is + for c-diff. In contact spore isolation. C/o some cramping pain but good relief with lortab. Antibx per orders. No s/s of pysch instability. Cooperative and pleasant       Goal: Adult Individualization and Mutuality  Outcome: Ongoing (interventions implemented as appropriate)  Goal: Discharge Needs Assessment  Outcome: Ongoing (interventions implemented as appropriate)    Problem: Infection, Risk/Actual (Adult)  Goal: Infection Prevention/Resolution  Outcome: Ongoing (interventions implemented as appropriate)

## 2017-01-11 NOTE — PROGRESS NOTES
Continued Stay Note  [facility]     Patient Name: Elif Cardona  MRN: [id number]  Today's Date: 1/11/2017    Admit Date: 1/8/2017          Discharge Plan       01/11/17 1441    Case Management/Social Work Plan    Additional Comments Voice mail for pt at home, I inquired how she is doing after discharge and ask that she call me back to let me know if I can make her a follow up appointment with her PCP              Discharge Codes     None        Expected Discharge Date and Time     Expected Discharge Date Expected Discharge Time    Ernesto 10, 2017             Sofiya Mchugh RN

## 2017-01-12 LAB
BACTERIA SPEC AEROBE CULT: NORMAL
BACTERIA SPEC AEROBE CULT: NORMAL

## 2017-01-24 ENCOUNTER — OFFICE VISIT (OUTPATIENT)
Dept: FAMILY MEDICINE CLINIC | Facility: CLINIC | Age: 62
End: 2017-01-24

## 2017-01-24 VITALS
RESPIRATION RATE: 18 BRPM | BODY MASS INDEX: 25.1 KG/M2 | HEART RATE: 96 BPM | DIASTOLIC BLOOD PRESSURE: 82 MMHG | WEIGHT: 147 LBS | TEMPERATURE: 97.7 F | SYSTOLIC BLOOD PRESSURE: 141 MMHG | HEIGHT: 64 IN

## 2017-01-24 DIAGNOSIS — A04.72 C. DIFFICILE COLITIS: Primary | ICD-10-CM

## 2017-01-24 DIAGNOSIS — F25.0 SCHIZOAFFECTIVE DISORDER, BIPOLAR TYPE (HCC): ICD-10-CM

## 2017-01-24 PROCEDURE — 99214 OFFICE O/P EST MOD 30 MIN: CPT | Performed by: FAMILY MEDICINE

## 2017-01-24 NOTE — MR AVS SNAPSHOT
Elif IRVIN Brendan   1/24/2017 1:00 PM   Office Visit    Provider:  Quinton Sin MD   Department:  [facility]   Dept Phone:  [phone]                Your Full Care Plan              Today's Medication Changes          These changes are accurate as of: 1/24/17  2:03 PM.  If you have any questions, ask your nurse or doctor.               New Medication(s)Ordered:     sertraline 50 MG tablet   Commonly known as:  ZOLOFT   Take 1 tablet by mouth Daily.   Started by:  Quinton Sin MD         Stop taking medication(s)listed here:     desvenlafaxine 50 MG 24 hr tablet   Commonly known as:  PRISTIQ   Stopped by:  Quinton Sin MD                Where to Get Your Medications      These medications were sent to [facility] [address] [place] [address] - [address] AT [facility] - [phone] [facility] [phone]   [address], [facility] [address]    Hours:  24-hours Phone:  [phone]     sertraline 50 MG tablet                  Your Updated Medication List          This list is accurate as of: 1/24/17  2:03 PM.  Always use your most recent med list.                alendronate 70 MG tablet   Commonly known as:  FOSAMAX   Take 1 tablet by mouth every 7 days. For bone       cetirizine 10 MG chewable tablet   Commonly known as:  ZyrTEC       gabapentin 100 MG capsule   Commonly known as:  NEURONTIN   Take two pills three times per day       hyoscyamine 0.375 MG 12 hr tablet   Commonly known as:  LEVBID   Take 1 tablet by mouth every 12 (twelve) hours as needed for cramping. PRN Abd cramping and IBS       lidocaine-hydrocortisone 3-0.5 % cream rectal cream   Change order to this dose and still BID prn       NYQUIL PO       risperiDONE 1 MG tablet   Commonly known as:  risperDAL       sertraline 50 MG tablet   Commonly known as:  ZOLOFT   Take 1 tablet by mouth Daily.       simethicone 80 MG chewable tablet   Commonly known  "as:  SHANTE   Chew 1 tablet 4 (Four) Times a Day As Needed for flatulence.               We Performed the Following     Ambulatory Referral to Gastroenterology       You Were Diagnosed With        Codes Comments    C. difficile colitis    -  Primary ICD-10-CM: A04.7  ICD-9-CM: 008.45 improving    Schizoaffective disorder, bipolar type     ICD-10-CM: F25.0  ICD-9-CM: 295.70       Instructions     None    Patient Instructions History      [employer] Signup     [facility] [employer] allows you to send messages to your doctor, view your test results, renew your prescriptions, schedule appointments, and more. To sign up, go to [employer] and click on the Sign Up Now link in the New User? box. Enter your [employer] Activation Code exactly as it appears below along with the last four digits of your Social Security Number and your Date of Birth () to complete the sign-up process. If you do not sign up before the expiration date, you must request a new code.    [employer] Activation Code: [email or web address]  Expires: 2017  2:03 PM    If you have questions, you can email [email or web address] or call [phone] to talk to our [employer] staff. Remember, [employer] is NOT to be used for urgent needs. For medical emergencies, dial 911.               Other Info from Your Visit           Allergies     No Known Allergies      Reason for Visit     c-diff hospital follow up       Vital Signs     Blood Pressure Pulse Temperature Respirations Height Weight    141/82 96 97.7 °F (36.5 °C) (Oral) 18 64\" (162.6 cm) 147 lb (66.7 kg)    Body Mass Index Smoking Status                25.23 kg/m2 Former Smoker          Problems and Diagnoses Noted     C. difficile colitis    Mental health problem      "

## 2017-01-24 NOTE — PROGRESS NOTES
Subjective   Elif Cardona is a 61 y.o. female.     CC: Hospital F/U for C.Diff.    History of Present Illness     Pt returns today to discuss her recent hospitalization. That visit was as follows:    Date of Admission: 1/8/2017  Date of Discharge: 1/10/2017     PCP: Quinton Sin MD        DISCHARGE DIAGNOSIS        Active Hospital Problems (** Indicates Principal Problem)     Diagnosis Date Noted   • **C. difficile colitis [A04.7] 01/09/2017   • Sepsis [A41.9] 01/09/2017   • Solitary pulmonary nodule on lung CT [R91.1] 01/09/2017   • Pancreatic divisum [Q45.3] 01/09/2017   • Schizoaffective disorder, bipolar type [F25.0] 03/17/2016   • Irritable bowel syndrome (IBS) [K58.9]     • Benign essential hypertension [I10] 01/06/2015   • Generalized anxiety disorder [F41.1] 08/24/2012         HOSPITAL COURSE  Patient is a 61 y.o. female presented to [facility] complaining of diarrhea. Please see the admitting history and physical for further details.      The patient is a 61-year-old female who presented with diarrhea. She had been taking Omnicef for 2 weeks 2 weeks prior to admission. Stool cultures and C. difficile was obtained and she was found to have C. difficile South colitis. CT scan of the abdomen showed colitis. Incidentally was found pancreatic divisum and dilated pancreatic ducts. Due to this I consulted gastroenterology for possible ERCP. This patient did have any evidence of pancreatitis ERCP was not indicated. There was also no mass seen on the CAT scan. She will follow-up with GI in 4 weeks and will need a colonoscopy in 8-10 weeks. I will discharge her with 10 days of oral Flagyl. She was improving nicely and tolerating oral diet, diarrhea was improving, and she was ambulating without difficulty. I discussed the discharge plan with the patient and she is in agreement. I also discussed not to drink any alcohol while she is taking Flagyl    Her diarrhea is almost completely resolved at this  time.    Ancillary, she liked the Zoloft better than the Pristiq and wishes to change back.    The following portions of the patient's history were reviewed and updated as appropriate: allergies, current medications, past family history, past medical history, past social history, past surgical history and problem list.    Review of Systems   Constitutional: Negative for activity change, chills, fatigue and fever.   Respiratory: Negative for cough and chest tightness.    Cardiovascular: Negative for chest pain and palpitations.   Gastrointestinal: Negative for abdominal pain, blood in stool, diarrhea and nausea.   Endocrine: Negative for cold intolerance.   Psychiatric/Behavioral: Negative for behavioral problems and dysphoric mood.       Objective   Physical Exam   Constitutional: She appears well-developed and well-nourished.   Neck: Neck supple. No thyromegaly present.   Cardiovascular: Normal rate and regular rhythm.    No murmur heard.  Pulmonary/Chest: Effort normal and breath sounds normal.   Abdominal: Bowel sounds are normal. There is no tenderness.   Psychiatric: She has a normal mood and affect. Her behavior is normal.   Nursing note and vitals reviewed.  Hospital records reviewed with pt confirming HPI.  Shaye Villanueva present for exam.      Assessment/Plan   Elif was seen today for c-diff.    Diagnoses and all orders for this visit:    C. difficile colitis  Comments:  improving  Orders:  -     Ambulatory Referral to Gastroenterology    Schizoaffective disorder, bipolar type  -     sertraline (ZOLOFT) 50 MG tablet; Take 1 tablet by mouth Daily.      Pt asked to use Probiotic and to keep f/u with her GI.

## 2017-01-27 DIAGNOSIS — K52.9 COLITIS: Primary | ICD-10-CM

## 2017-02-14 DIAGNOSIS — R10.9 ABDOMINAL CRAMPING: ICD-10-CM

## 2017-02-15 RX ORDER — HYOSCYAMINE SULFATE EXTENDED-RELEASE 0.38 MG/1
0.38 TABLET ORAL EVERY 12 HOURS PRN
Qty: 60 TABLET | Refills: 5 | Status: SHIPPED | OUTPATIENT
Start: 2017-02-15 | End: 2017-08-08 | Stop reason: SDUPTHER

## 2017-06-29 ENCOUNTER — APPOINTMENT (OUTPATIENT)
Dept: WOMENS IMAGING | Facility: HOSPITAL | Age: 62
End: 2017-06-29

## 2017-06-29 PROCEDURE — 77067 SCR MAMMO BI INCL CAD: CPT | Performed by: RADIOLOGY

## 2017-06-29 PROCEDURE — 77063 BREAST TOMOSYNTHESIS BI: CPT | Performed by: RADIOLOGY

## 2017-07-26 ENCOUNTER — TELEPHONE (OUTPATIENT)
Dept: FAMILY MEDICINE CLINIC | Facility: CLINIC | Age: 62
End: 2017-07-26

## 2017-07-26 NOTE — TELEPHONE ENCOUNTER
Pt is wanting to have the lidocaine-hydrocortisone cream. She called [facility] and the RX is . They are not able to send a refill request.

## 2017-08-08 ENCOUNTER — OFFICE VISIT (OUTPATIENT)
Dept: FAMILY MEDICINE CLINIC | Facility: CLINIC | Age: 62
End: 2017-08-08

## 2017-08-08 VITALS
RESPIRATION RATE: 18 BRPM | HEIGHT: 64 IN | DIASTOLIC BLOOD PRESSURE: 84 MMHG | SYSTOLIC BLOOD PRESSURE: 152 MMHG | TEMPERATURE: 98.2 F | WEIGHT: 146 LBS | HEART RATE: 96 BPM | BODY MASS INDEX: 24.92 KG/M2

## 2017-08-08 DIAGNOSIS — F25.0 SCHIZOAFFECTIVE DISORDER, BIPOLAR TYPE (HCC): ICD-10-CM

## 2017-08-08 DIAGNOSIS — R10.9 ABDOMINAL CRAMPING: ICD-10-CM

## 2017-08-08 DIAGNOSIS — I10 BENIGN ESSENTIAL HYPERTENSION: Primary | ICD-10-CM

## 2017-08-08 PROCEDURE — 99214 OFFICE O/P EST MOD 30 MIN: CPT | Performed by: FAMILY MEDICINE

## 2017-08-08 RX ORDER — HYOSCYAMINE SULFATE EXTENDED-RELEASE 0.38 MG/1
0.38 TABLET ORAL EVERY 12 HOURS PRN
Qty: 60 TABLET | Refills: 5 | Status: SHIPPED | OUTPATIENT
Start: 2017-08-08 | End: 2018-02-15 | Stop reason: SDUPTHER

## 2017-08-08 RX ORDER — LISINOPRIL 10 MG/1
10 TABLET ORAL DAILY
Qty: 30 TABLET | Refills: 5 | Status: SHIPPED | OUTPATIENT
Start: 2017-08-08 | End: 2018-02-15 | Stop reason: SDUPTHER

## 2017-08-08 NOTE — PROGRESS NOTES
Subjective   Elif Cardona is a 61 y.o. female.     History of Present Illness     Chief Complaint:   Chief Complaint   Patient presents with   • Hypertension     MED REFILL    • Arthritis     BONE DENS DUE    • BIPOLAR DISORDER       Elif Cardona 61 y.o. female who presents today for Medical Management of the below listed issues and medication refills.  she has a history of   Patient Active Problem List   Diagnosis   • Allergic rhinitis   • Fibromyalgia   • Generalized anxiety disorder   • Hemorrhoids   • Benign essential hypertension   • Irritable bowel syndrome (IBS)   • Major depressive disorder, recurrent episode, in full remission   • Osteoarthritis, multiple sites   • Post-menopausal   • Rash and nonspecific skin eruption   • Sebaceous cyst   • Sinusitis, acute   • Schizoaffective disorder, bipolar type   • Hyperglycemia   • Tobacco abuse   • HTN (hypertension)   • Peripheral neuropathic pain   • Urinary tract infection without hematuria   • C. difficile colitis   • Sepsis   • Solitary pulmonary nodule on lung CT   • Pancreatic divisum   .  Since the last visit, she has overall felt well.  she has been compliant with   Current Outpatient Prescriptions:   •  hyoscyamine (LEVBID) 0.375 MG 12 hr tablet, Take 1 tablet by mouth Every 12 (Twelve) Hours As Needed for Cramping. PRN Abd cramping and IBS, Disp: 60 tablet, Rfl: 5  •  sertraline (ZOLOFT) 50 MG tablet, Take 1 tablet by mouth Daily., Disp: 30 tablet, Rfl: 5  •  alendronate (FOSAMAX) 70 MG tablet, Take 1 tablet by mouth every 7 days. For bone (Patient taking differently: Take 70 mg by mouth Every 7 (Seven) Days. For bone. Pt took it last on Friday 1/6/17), Disp: 4 tablet, Rfl: 11  •  cetirizine (ZyrTEC) 10 MG chewable tablet, Chew 10 mg daily., Disp: , Rfl:   •  gabapentin (NEURONTIN) 100 MG capsule, Take two pills three times per day, Disp: 90 capsule, Rfl: 0  •  lidocaine-hydrocortisone 3-0.5 % cream rectal cream, Change order to this dose and still BID  "prn, Disp: 85 g, Rfl: 2  •  lisinopril (PRINIVIL,ZESTRIL) 10 MG tablet, Take 1 tablet by mouth Daily., Disp: 30 tablet, Rfl: 5  •  Pseudoeph-Doxylamine-DM-APAP (NYQUIL PO), Take  by mouth Daily As Needed., Disp: , Rfl:   •  risperiDONE (RisperDAL) 1 MG tablet, Take 2 mg by mouth Daily., Disp: , Rfl:   •  simethicone (MYLICON) 80 MG chewable tablet, Chew 1 tablet 4 (Four) Times a Day As Needed for flatulence., Disp: 30 tablet, Rfl: 0.  she denies medication side effects.    All of the chronic condition(s) listed above are stable w/o issues.    /84  Pulse 96  Temp 98.2 °F (36.8 °C) (Oral)   Resp 18  Ht 64\" (162.6 cm)  Wt 146 lb (66.2 kg)  BMI 25.06 kg/m2    Results for orders placed or performed during the hospital encounter of 01/08/17   Clostridium Difficile Toxin, PCR   Result Value Ref Range    C. Difficile Toxins by PCR Positive (C) Negative   Stool Culture   Result Value Ref Range    Stool Culture       No Salmonella, Shigella, Campylobacter or E coli O157:H7 isolated.    Stool Culture Normal Fecal Felicia    Comprehensive Metabolic Panel   Result Value Ref Range    Glucose 118 (H) 65 - 99 mg/dL    BUN 6 6 - 20 mg/dL    Creatinine 0.72 0.57 - 1.00 mg/dL    Sodium 140 136 - 145 mmol/L    Potassium 4.0 3.5 - 5.2 mmol/L    Chloride 100 98 - 107 mmol/L    CO2 26.6 22.0 - 29.0 mmol/L    Calcium 9.1 8.6 - 10.5 mg/dL    Total Protein 6.7 6.0 - 8.5 g/dL    Albumin 4.00 3.50 - 5.20 g/dL    ALT (SGPT) 13 1 - 33 U/L    AST (SGOT) 15 1 - 32 U/L    Alkaline Phosphatase 67 39 - 117 U/L    Total Bilirubin 0.3 0.1 - 1.2 mg/dL    eGFR Non African Amer 83 >60 mL/min/1.73    Globulin 2.7 gm/dL    A/G Ratio 1.5 g/dL    BUN/Creatinine Ratio 8.3 7.0 - 25.0    Anion Gap 13.4 mmol/L   Urinalysis With / Culture If Indicated   Result Value Ref Range    Color, UA Yellow Yellow, Straw    Appearance, UA Clear Clear    pH, UA 6.5 5.0 - 8.0    Specific Gravity, UA >=1.030 1.005 - 1.030    Glucose, UA Negative Negative    Ketones, UA " Negative Negative    Bilirubin, UA Negative Negative    Blood, UA Trace (A) Negative    Protein, UA Negative Negative    Leuk Esterase, UA Negative Negative    Nitrite, UA Negative Negative    Urobilinogen, UA 0.2 E.U./dL 0.2 - 1.0 E.U./dL   Lipase   Result Value Ref Range    Lipase 20 13 - 60 U/L   CBC Auto Differential   Result Value Ref Range    WBC 15.43 (H) 4.50 - 10.70 10*3/mm3    RBC 4.33 3.90 - 5.20 10*6/mm3    Hemoglobin 12.9 11.9 - 15.5 g/dL    Hematocrit 41.7 35.6 - 45.5 %    MCV 96.3 80.5 - 98.2 fL    MCH 29.8 26.9 - 32.0 pg    MCHC 30.9 (L) 32.4 - 36.3 g/dL    RDW 13.2 (H) 11.7 - 13.0 %    RDW-SD 46.0 37.0 - 54.0 fl    MPV 9.4 6.0 - 12.0 fL    Platelets 479 140 - 500 10*3/mm3    Neutrophil % 80.4 (H) 42.7 - 76.0 %    Lymphocyte % 13.0 (L) 19.6 - 45.3 %    Monocyte % 5.2 5.0 - 12.0 %    Eosinophil % 0.7 0.3 - 6.2 %    Basophil % 0.4 0.0 - 1.5 %    Immature Grans % 0.3 0.0 - 0.5 %    Neutrophils, Absolute 12.40 (H) 1.90 - 8.10 10*3/mm3    Lymphocytes, Absolute 2.01 0.90 - 4.80 10*3/mm3    Monocytes, Absolute 0.81 0.20 - 1.20 10*3/mm3    Eosinophils, Absolute 0.11 0.00 - 0.70 10*3/mm3    Basophils, Absolute 0.06 0.00 - 0.20 10*3/mm3    Immature Grans, Absolute 0.04 (H) 0.00 - 0.03 10*3/mm3   Urinalysis, Microscopic Only   Result Value Ref Range    RBC, UA 0-2 (A) None Seen /HPF    WBC, UA 0-2 (A) None Seen /HPF    Bacteria, UA None Seen None Seen /HPF    Squamous Epithelial Cells, UA 3-6 (A) None Seen, 0-2 /HPF    Hyaline Casts, UA None Seen None Seen /LPF    Methodology Manual Light Microscopy    CBC Auto Differential   Result Value Ref Range    WBC 7.55 4.50 - 10.70 10*3/mm3    RBC 3.64 (L) 3.90 - 5.20 10*6/mm3    Hemoglobin 11.0 (L) 11.9 - 15.5 g/dL    Hematocrit 35.1 (L) 35.6 - 45.5 %    MCV 96.4 80.5 - 98.2 fL    MCH 30.2 26.9 - 32.0 pg    MCHC 31.3 (L) 32.4 - 36.3 g/dL    RDW 13.2 (H) 11.7 - 13.0 %    RDW-SD 46.1 37.0 - 54.0 fl    MPV 9.5 6.0 - 12.0 fL    Platelets 360 140 - 500 10*3/mm3     Neutrophil % 66.6 42.7 - 76.0 %    Lymphocyte % 23.3 19.6 - 45.3 %    Monocyte % 6.8 5.0 - 12.0 %    Eosinophil % 2.5 0.3 - 6.2 %    Basophil % 0.5 0.0 - 1.5 %    Immature Grans % 0.3 0.0 - 0.5 %    Neutrophils, Absolute 5.03 1.90 - 8.10 10*3/mm3    Lymphocytes, Absolute 1.76 0.90 - 4.80 10*3/mm3    Monocytes, Absolute 0.51 0.20 - 1.20 10*3/mm3    Eosinophils, Absolute 0.19 0.00 - 0.70 10*3/mm3    Basophils, Absolute 0.04 0.00 - 0.20 10*3/mm3    Immature Grans, Absolute 0.02 0.00 - 0.03 10*3/mm3   Basic Metabolic Panel   Result Value Ref Range    Glucose 196 (H) 65 - 99 mg/dL    BUN 5 (L) 6 - 20 mg/dL    Creatinine 0.68 0.57 - 1.00 mg/dL    Sodium 141 136 - 145 mmol/L    Potassium 3.5 3.5 - 5.2 mmol/L    Chloride 105 98 - 107 mmol/L    CO2 22.6 22.0 - 29.0 mmol/L    Calcium 8.2 (L) 8.6 - 10.5 mg/dL    eGFR Non African Amer 89 >60 mL/min/1.73    BUN/Creatinine Ratio 7.4 7.0 - 25.0    Anion Gap 13.4 mmol/L   Lipase   Result Value Ref Range    Lipase 25 13 - 60 U/L         The following portions of the patient's history were reviewed and updated as appropriate: allergies, current medications, past family history, past medical history, past social history, past surgical history and problem list.    Review of Systems   Constitutional: Negative for activity change, chills, fatigue and fever.   Respiratory: Negative for cough and chest tightness.    Cardiovascular: Negative for chest pain and palpitations.   Gastrointestinal: Negative for abdominal pain and nausea.   Endocrine: Negative for cold intolerance.   Psychiatric/Behavioral: Negative for behavioral problems and dysphoric mood.       Objective   Physical Exam    Assessment/Plan   Elif was seen today for hypertension, arthritis and bipolar disorder.    Diagnoses and all orders for this visit:    Benign essential hypertension  -     lisinopril (PRINIVIL,ZESTRIL) 10 MG tablet; Take 1 tablet by mouth Daily.    Schizoaffective disorder, bipolar type  -     sertraline  (ZOLOFT) 50 MG tablet; Take 1 tablet by mouth Daily.    Abdominal cramping  -     hyoscyamine (LEVBID) 0.375 MG 12 hr tablet; Take 1 tablet by mouth Every 12 (Twelve) Hours As Needed for Cramping. PRN Abd cramping and IBS

## 2017-08-23 ENCOUNTER — OFFICE VISIT (OUTPATIENT)
Dept: FAMILY MEDICINE CLINIC | Facility: CLINIC | Age: 62
End: 2017-08-23

## 2017-08-23 VITALS
SYSTOLIC BLOOD PRESSURE: 162 MMHG | BODY MASS INDEX: 24.92 KG/M2 | WEIGHT: 146 LBS | HEIGHT: 64 IN | HEART RATE: 88 BPM | RESPIRATION RATE: 16 BRPM | TEMPERATURE: 98.2 F | DIASTOLIC BLOOD PRESSURE: 102 MMHG

## 2017-08-23 DIAGNOSIS — J01.00 ACUTE NON-RECURRENT MAXILLARY SINUSITIS: Primary | ICD-10-CM

## 2017-08-23 DIAGNOSIS — M15.3 OTHER SECONDARY OSTEOARTHRITIS OF MULTIPLE SITES: ICD-10-CM

## 2017-08-23 PROCEDURE — 99213 OFFICE O/P EST LOW 20 MIN: CPT | Performed by: FAMILY MEDICINE

## 2017-08-23 RX ORDER — MELOXICAM 15 MG/1
15 TABLET ORAL DAILY
Qty: 30 TABLET | Refills: 5 | Status: SHIPPED | OUTPATIENT
Start: 2017-08-23 | End: 2018-02-15

## 2017-08-23 RX ORDER — CEFDINIR 300 MG/1
300 CAPSULE ORAL 2 TIMES DAILY
Qty: 20 CAPSULE | Refills: 0 | Status: SHIPPED | OUTPATIENT
Start: 2017-08-23 | End: 2017-09-02

## 2017-08-23 NOTE — PROGRESS NOTES
"Subjective   Elif Cardona is a 61 y.o. female.     CC: URI    History of Present Illness     Elif Cardona 61 y.o. female who presents for evaluation of sinus pressure and congestion, cough. Symptoms include congestion and dry cough.  Onset of symptoms was 1 week ago, unchanged since that time. Patient denies shortness of breath, wheezing.   Evaluation to date: none Treatment to date:  none.     Ancillary, she would like a refill script on some Meloxicam for OA.    The following portions of the patient's history were reviewed and updated as appropriate: allergies, current medications, past family history, past medical history, past social history, past surgical history and problem list.    Review of Systems   Constitutional: Negative for activity change, chills, fatigue and fever.   HENT: Positive for congestion and sinus pressure.    Respiratory: Positive for cough. Negative for chest tightness.    Cardiovascular: Negative for chest pain and palpitations.   Gastrointestinal: Negative for abdominal pain and nausea.   Endocrine: Negative for cold intolerance.   Psychiatric/Behavioral: Negative for behavioral problems and dysphoric mood.     BP (!) 162/102  Pulse 88  Temp 98.2 °F (36.8 °C) (Oral)   Resp 16  Ht 64\" (162.6 cm)  Wt 146 lb (66.2 kg)  BMI 25.06 kg/m2    Objective   Physical Exam   Constitutional: She appears well-developed and well-nourished.   HENT:   Nose: Right sinus exhibits maxillary sinus tenderness. Left sinus exhibits maxillary sinus tenderness.   Neck: Neck supple. No thyromegaly present.   Cardiovascular: Normal rate and regular rhythm.    No murmur heard.  Pulmonary/Chest: Effort normal and breath sounds normal.   Lymphadenopathy:     She has no cervical adenopathy.   Psychiatric: She has a normal mood and affect. Her behavior is normal.   Nursing note and vitals reviewed.      Assessment/Plan   Elif was seen today for nasal congestion and sinusitis.    Diagnoses and all orders for this " visit:    Acute non-recurrent maxillary sinusitis  -     cefdinir (OMNICEF) 300 MG capsule; Take 1 capsule by mouth 2 (Two) Times a Day for 10 days.    Other secondary osteoarthritis of multiple sites  -     meloxicam (MOBIC) 15 MG tablet; Take 1 tablet by mouth Daily.

## 2017-10-26 ENCOUNTER — OFFICE VISIT (OUTPATIENT)
Dept: FAMILY MEDICINE CLINIC | Facility: CLINIC | Age: 62
End: 2017-10-26

## 2017-10-26 VITALS
RESPIRATION RATE: 16 BRPM | TEMPERATURE: 98.6 F | DIASTOLIC BLOOD PRESSURE: 92 MMHG | SYSTOLIC BLOOD PRESSURE: 149 MMHG | HEIGHT: 64 IN | BODY MASS INDEX: 25.44 KG/M2 | WEIGHT: 149 LBS | HEART RATE: 110 BPM
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
## 2017-10-26 PROBLEM — A41.9 SEPSIS: Status: RESOLVED | Noted: 2017-01-09 | Resolved: 2017-10-26

## 2017-10-26 PROBLEM — A04.72 C. DIFFICILE COLITIS: Status: RESOLVED | Noted: 2017-01-09 | Resolved: 2017-10-26

## 2017-10-26 PROCEDURE — 99213 OFFICE O/P EST LOW 20 MIN: CPT | Performed by: FAMILY MEDICINE

## 2017-10-26 RX ORDER — HYDROXYZINE 50 MG/1
50 TABLET, FILM COATED ORAL 3 TIMES DAILY PRN
Qty: 30 TABLET | Refills: 0 | Status: SHIPPED | OUTPATIENT
Start: 2017-10-26 | End: 2018-09-19 | Stop reason: SDUPTHER

## 2017-10-26 RX ORDER — HYDROCORTISONE 25 MG/ML
LOTION TOPICAL
COMMUNITY
Start: 2017-10-09 | End: 2023-01-01 | Stop reason: SDUPTHER

## 2017-10-26 NOTE — PROGRESS NOTES
"Chief Complaint   Patient presents with   • Insect Bite       Subjective   This patient presents the office with chief complaint of insect bites over the past 2 weeks.  Her  has had similar issues.  She has several on her trunk. Insects have not been seen. Sx include itching. She has dogs but they do not sleep with her.   I have reviewed and updated her medications, medical history and problem list during today's office visit.        Social History   Substance Use Topics   • Smoking status: Former Smoker     Packs/day: 0.50     Quit date: 5/2/2016   • Smokeless tobacco: Never Used   • Alcohol use No       Review of Systems   Skin:        itching       Objective   /92  Pulse 110  Temp 98.6 °F (37 °C) (Oral)   Resp 16  Ht 64\" (162.6 cm)  Wt 149 lb (67.6 kg)  BMI 25.58 kg/m2  Body mass index is 25.58 kg/(m^2).  Physical Exam   Constitutional: She appears well-developed. No distress.   Skin:   Small red lesions x 2 on left anterior lower trunk, c/w insect bites, one small lesion right upper anterior trunk       Data Reviewed:        Assessment/Plan     Problem List Items Addressed This Visit     None      Visit Diagnoses     Insect bite, initial encounter    -  Primary    Relevant Orders    Ambulatory Referral to Dermatology          Outpatient Encounter Prescriptions as of 10/26/2017   Medication Sig Dispense Refill   • alendronate (FOSAMAX) 70 MG tablet Take 1 tablet by mouth every 7 days. For bone (Patient taking differently: Take 70 mg by mouth Every 7 (Seven) Days. For bone. Pt took it last on Friday 1/6/17) 4 tablet 11   • cetirizine (ZyrTEC) 10 MG chewable tablet Chew 10 mg daily.     • hydrocortisone 2.5 % lotion Apply  topically.     • hyoscyamine (LEVBID) 0.375 MG 12 hr tablet Take 1 tablet by mouth Every 12 (Twelve) Hours As Needed for Cramping. PRN Abd cramping and IBS (Patient taking differently: Take 0.375 mg by mouth As Needed for Cramping. PRN Abd cramping and IBS) 60 tablet 5   • " lidocaine-hydrocortisone 3-0.5 % cream rectal cream Change order to this dose and still BID prn 85 g 2   • lisinopril (PRINIVIL,ZESTRIL) 10 MG tablet Take 1 tablet by mouth Daily. 30 tablet 5   • meloxicam (MOBIC) 15 MG tablet Take 1 tablet by mouth Daily. (Patient taking differently: Take 15 mg by mouth As Needed.) 30 tablet 5   • risperiDONE (RisperDAL) 1 MG tablet Take 2 mg by mouth Daily.     • sertraline (ZOLOFT) 50 MG tablet Take 1 tablet by mouth Daily. 30 tablet 5   • simethicone (MYLICON) 80 MG chewable tablet Chew 1 tablet 4 (Four) Times a Day As Needed for flatulence. 30 tablet 0   • hydrOXYzine (ATARAX) 50 MG tablet Take 1 tablet by mouth 3 (Three) Times a Day As Needed for Itching for up to 10 days. 30 tablet 0   • [DISCONTINUED] gabapentin (NEURONTIN) 100 MG capsule Take two pills three times per day 90 capsule 0   • [DISCONTINUED] Pseudoeph-Doxylamine-DM-APAP (NYQUIL PO) Take  by mouth Daily As Needed.       No facility-administered encounter medications on file as of 10/26/2017.        Orders Placed This Encounter   Procedures   • Ambulatory Referral to Dermatology     Referral Priority:   Routine     Referral Type:   Consultation     Referral Reason:   Specialty Services Required     Referred to Provider:   Quinton Xiong MD     Requested Specialty:   Dermatology     Number of Visits Requested:   1              RTC as needed or sooner if symptoms worsen or change

## 2017-10-30 ENCOUNTER — OFFICE VISIT (OUTPATIENT)
Dept: FAMILY MEDICINE CLINIC | Facility: CLINIC | Age: 62
End: 2017-10-30

## 2017-10-30 VITALS
BODY MASS INDEX: 25.44 KG/M2 | WEIGHT: 149 LBS | TEMPERATURE: 99.3 F | HEART RATE: 90 BPM | RESPIRATION RATE: 16 BRPM | SYSTOLIC BLOOD PRESSURE: 130 MMHG | OXYGEN SATURATION: 94 % | HEIGHT: 64 IN | DIASTOLIC BLOOD PRESSURE: 84 MMHG

## 2017-10-30 DIAGNOSIS — J01.90 ACUTE SINUSITIS, RECURRENCE NOT SPECIFIED, UNSPECIFIED LOCATION: Primary | ICD-10-CM

## 2017-10-30 PROCEDURE — 99213 OFFICE O/P EST LOW 20 MIN: CPT | Performed by: PHYSICIAN ASSISTANT

## 2017-10-30 RX ORDER — CEFDINIR 300 MG/1
CAPSULE ORAL
Qty: 20 CAPSULE | Refills: 0 | Status: SHIPPED | OUTPATIENT
Start: 2017-10-30 | End: 2017-12-21

## 2017-10-30 NOTE — PROGRESS NOTES
Subjective   Elif Cardona is a 62 y.o. female.     History of Present Illness   Elif Cardona 62 y.o. female who presents for evaluation of upper respiratory congestion, sore throat, cough, fatigue. Symptoms include sneezing, nasal blockage, post nasal drip and cough described as productive of yellow and green sputum.  Onset of symptoms was 3 days ago, gradually worsening since that time. Patient denies shortness of breath, wheezing, fever.   Evaluation to date: none Treatment to date:  OTC oral decongestants and OTC cough suppressant.     She is clammy today; chills    The following portions of the patient's history were reviewed and updated as appropriate: allergies, current medications, past family history, past medical history, past social history, past surgical history and problem list.    Review of Systems   Constitutional: Positive for fatigue. Negative for activity change and unexpected weight change.   HENT: Positive for congestion, ear discharge, postnasal drip, sneezing and sore throat. Negative for ear pain.    Eyes: Negative for pain and discharge.   Respiratory: Positive for cough. Negative for chest tightness, shortness of breath and wheezing.    Cardiovascular: Negative for chest pain and palpitations.   Gastrointestinal: Positive for constipation and diarrhea. Negative for abdominal pain and vomiting.   Endocrine: Negative.    Genitourinary: Positive for difficulty urinating.   Musculoskeletal: Negative for joint swelling.   Skin: Positive for rash (improving). Negative for color change and wound.   Allergic/Immunologic: Negative.    Neurological: Positive for headaches. Negative for seizures and syncope.   Psychiatric/Behavioral: Positive for confusion, decreased concentration and sleep disturbance.       Objective   Physical Exam   Constitutional: She is oriented to person, place, and time. She appears well-developed and well-nourished.  Non-toxic appearance. No distress.   HENT:   Head:  Normocephalic and atraumatic. Hair is normal.   Right Ear: External ear normal. No drainage, swelling or tenderness. Tympanic membrane is retracted.   Left Ear: External ear normal. No drainage, swelling or tenderness. Tympanic membrane is retracted.   Nose: Mucosal edema present. No epistaxis.   Mouth/Throat: Uvula is midline and mucous membranes are normal. No oral lesions. No uvula swelling. Posterior oropharyngeal erythema present. No oropharyngeal exudate.   Eyes: Conjunctivae and EOM are normal. Pupils are equal, round, and reactive to light. Right eye exhibits no discharge. Left eye exhibits no discharge. No scleral icterus.   Neck: Normal range of motion. Neck supple.   Cardiovascular: Normal rate, regular rhythm and normal heart sounds.  Exam reveals no gallop.    No murmur heard.  Pulmonary/Chest: Breath sounds normal. No stridor. No respiratory distress. She has no wheezes. She has no rales. She exhibits no tenderness.   Abdominal: Soft. There is no tenderness.   Lymphadenopathy:     She has cervical adenopathy.   Neurological: She is alert and oriented to person, place, and time. She exhibits normal muscle tone.   Skin: Skin is warm and dry. Rash (left side anterior neck with pink oval papule 1cm) noted. She is not diaphoretic.   Psychiatric: Her behavior is normal. Judgment and thought content normal.   Nursing note and vitals reviewed.      Assessment/Plan   Problems Addressed this Visit     None      Visit Diagnoses     Acute sinusitis, recurrence not specified, unspecified location    -  Primary

## 2017-12-21 ENCOUNTER — OFFICE VISIT (OUTPATIENT)
Dept: FAMILY MEDICINE CLINIC | Facility: CLINIC | Age: 62
End: 2017-12-21

## 2017-12-21 VITALS
RESPIRATION RATE: 16 BRPM | BODY MASS INDEX: 25.27 KG/M2 | WEIGHT: 148 LBS | HEART RATE: 82 BPM | DIASTOLIC BLOOD PRESSURE: 82 MMHG | HEIGHT: 64 IN | TEMPERATURE: 99.1 F | SYSTOLIC BLOOD PRESSURE: 122 MMHG | OXYGEN SATURATION: 96 %

## 2017-12-21 DIAGNOSIS — J01.90 ACUTE SINUSITIS, RECURRENCE NOT SPECIFIED, UNSPECIFIED LOCATION: Primary | ICD-10-CM

## 2017-12-21 PROCEDURE — 99213 OFFICE O/P EST LOW 20 MIN: CPT | Performed by: PHYSICIAN ASSISTANT

## 2017-12-21 RX ORDER — METHYLPREDNISOLONE 4 MG/1
TABLET ORAL
Qty: 21 TABLET | Refills: 0 | Status: SHIPPED | OUTPATIENT
Start: 2017-12-21 | End: 2018-02-15

## 2017-12-21 RX ORDER — CEFDINIR 300 MG/1
CAPSULE ORAL
Qty: 20 CAPSULE | Refills: 1 | Status: SHIPPED | OUTPATIENT
Start: 2017-12-21 | End: 2018-02-15

## 2017-12-21 NOTE — PATIENT INSTRUCTIONS
Probiotic X 30 days  For throat pain:  Can gargle with 1/2 Maalox and 1/2 Benadryl liquid ---mix, gargle and spit Take Tylenol for fever or aches  Rest as needed  Call not better in 7 days  Increase fluids  Call if worse  Can use generic Afrin nasal spray up to 5 days for nasal congestion  Finish antibiotic course of therapy  Stop smoking

## 2017-12-21 NOTE — PROGRESS NOTES
Subjective   Elif Cardona is a 62 y.o. female.     History of Present Illness   Elif Cardona 62 y.o. female who presents for evaluation of upper respiratory congestion, cough. Symptoms include sneezing, post nasal drip, cough described as productive of brown sputum and hoarseness.  Onset of symptoms was 1 day ago, gradually worsening since that time. Patient denies shortness of breath, wheezing, fever.   Evaluation to date: none Treatment to date:  OTC oral decongestants and OTC cough suppressant.       The following portions of the patient's history were reviewed and updated as appropriate: allergies, current medications, past family history, past medical history, past social history, past surgical history and problem list.    Review of Systems   Constitutional: Positive for fatigue. Negative for activity change and unexpected weight change.   HENT: Positive for congestion, postnasal drip, rhinorrhea, sinus pain, sinus pressure, sneezing and sore throat. Negative for ear pain.    Eyes: Negative for pain and discharge.   Respiratory: Positive for cough. Negative for chest tightness, shortness of breath and wheezing.    Cardiovascular: Negative for chest pain and palpitations.   Gastrointestinal: Positive for diarrhea and rectal pain. Negative for abdominal pain and vomiting.   Endocrine: Positive for polydipsia.   Genitourinary: Negative.    Musculoskeletal: Positive for myalgias. Negative for joint swelling.   Skin: Negative for color change, rash and wound.   Allergic/Immunologic: Negative.    Neurological: Negative for seizures and syncope.   Psychiatric/Behavioral: Positive for sleep disturbance.       Objective   Physical Exam   Constitutional: She is oriented to person, place, and time. She appears well-developed and well-nourished.  Non-toxic appearance. No distress.   HENT:   Head: Normocephalic and atraumatic. Hair is normal.   Right Ear: External ear normal. No drainage, swelling or tenderness. Tympanic  membrane is retracted.   Left Ear: External ear normal. No drainage, swelling or tenderness. Tympanic membrane is retracted.   Nose: Mucosal edema present. No epistaxis.   Mouth/Throat: Uvula is midline and mucous membranes are normal. No oral lesions. No uvula swelling. Posterior oropharyngeal erythema present. No oropharyngeal exudate.   Eyes: Conjunctivae and EOM are normal. Pupils are equal, round, and reactive to light. Right eye exhibits no discharge. Left eye exhibits no discharge. No scleral icterus.   Neck: Normal range of motion. Neck supple.   Cardiovascular: Normal rate, regular rhythm and normal heart sounds.  Exam reveals no gallop.    No murmur heard.  Pulmonary/Chest: Breath sounds normal. No stridor. No respiratory distress. She has no wheezes. She has no rales. She exhibits no tenderness.   Abdominal: Soft. There is no tenderness.   Lymphadenopathy:     She has cervical adenopathy.   Neurological: She is alert and oriented to person, place, and time. She exhibits normal muscle tone.   Skin: Skin is warm and dry. No rash noted. She is not diaphoretic.   Psychiatric: She has a normal mood and affect. Her behavior is normal. Judgment and thought content normal.   Nursing note and vitals reviewed.      Assessment/Plan   Elif was seen today for illness.    Diagnoses and all orders for this visit:    Acute sinusitis, recurrence not specified, unspecified location    Other orders  -     MethylPREDNISolone (MEDROL, DAMIAN,) 4 MG tablet; follow package directions; for acute Bronchitis; (put on file)  -     cefdinir (OMNICEF) 300 MG capsule; One cap PO BID for infection

## 2018-02-15 ENCOUNTER — OFFICE VISIT (OUTPATIENT)
Dept: FAMILY MEDICINE CLINIC | Facility: CLINIC | Age: 63
End: 2018-02-15

## 2018-02-15 VITALS
SYSTOLIC BLOOD PRESSURE: 161 MMHG | RESPIRATION RATE: 16 BRPM | DIASTOLIC BLOOD PRESSURE: 100 MMHG | HEART RATE: 92 BPM | WEIGHT: 150 LBS | TEMPERATURE: 98.3 F | OXYGEN SATURATION: 96 % | BODY MASS INDEX: 25.61 KG/M2 | HEIGHT: 64 IN

## 2018-02-15 DIAGNOSIS — R10.9 ABDOMINAL CRAMPING: ICD-10-CM

## 2018-02-15 DIAGNOSIS — M79.7 FIBROMYALGIA: ICD-10-CM

## 2018-02-15 DIAGNOSIS — R73.01 IFG (IMPAIRED FASTING GLUCOSE): ICD-10-CM

## 2018-02-15 DIAGNOSIS — F25.0 SCHIZOAFFECTIVE DISORDER, BIPOLAR TYPE (HCC): Primary | ICD-10-CM

## 2018-02-15 DIAGNOSIS — I10 BENIGN ESSENTIAL HYPERTENSION: ICD-10-CM

## 2018-02-15 PROCEDURE — 99214 OFFICE O/P EST MOD 30 MIN: CPT | Performed by: FAMILY MEDICINE

## 2018-02-15 RX ORDER — LISINOPRIL 10 MG/1
10 TABLET ORAL DAILY
Qty: 30 TABLET | Refills: 5 | Status: SHIPPED | OUTPATIENT
Start: 2018-02-15 | End: 2018-09-19 | Stop reason: SDUPTHER

## 2018-02-15 RX ORDER — CELECOXIB 200 MG/1
200 CAPSULE ORAL DAILY
Qty: 30 CAPSULE | Refills: 5 | Status: SHIPPED | OUTPATIENT
Start: 2018-02-15 | End: 2019-05-10 | Stop reason: SDDI

## 2018-02-15 RX ORDER — HYOSCYAMINE SULFATE EXTENDED-RELEASE 0.38 MG/1
0.38 TABLET ORAL EVERY 12 HOURS PRN
Qty: 60 TABLET | Refills: 5 | Status: SHIPPED | OUTPATIENT
Start: 2018-02-15 | End: 2019-09-03 | Stop reason: SDUPTHER

## 2018-02-15 RX ORDER — RISPERIDONE 1 MG/1
1 TABLET ORAL DAILY
Qty: 30 TABLET | Refills: 5 | Status: SHIPPED | OUTPATIENT
Start: 2018-02-15 | End: 2019-05-10 | Stop reason: SDDI

## 2018-02-15 NOTE — PROGRESS NOTES
Subjective   Elif Cardona is a 62 y.o. female.     History of Present Illness     Chief Complaint:   Chief Complaint   Patient presents with   • Fibromyalgia   • Hypertension       Elif Cardona 62 y.o. female who presents today for Medical Management of the below listed issues and medication refills.  she has a problem list of   Patient Active Problem List   Diagnosis   • Allergic rhinitis   • Fibromyalgia   • Generalized anxiety disorder   • Hemorrhoids   • Benign essential hypertension   • Irritable bowel syndrome (IBS)   • Major depressive disorder, recurrent episode, in full remission   • Osteoarthritis, multiple sites   • Post-menopausal   • Rash and nonspecific skin eruption   • Sebaceous cyst   • Schizoaffective disorder, bipolar type   • Hyperglycemia   • Tobacco abuse   • HTN (hypertension)   • Peripheral neuropathic pain   • Solitary pulmonary nodule on lung CT   • Pancreatic divisum   • Acute sinusitis   • IFG (impaired fasting glucose)   .  Since the last visit, she has overall felt well.  she has been compliant with   Current Outpatient Prescriptions:   •  lisinopril (PRINIVIL,ZESTRIL) 10 MG tablet, Take 1 tablet by mouth Daily., Disp: 30 tablet, Rfl: 5  •  alendronate (FOSAMAX) 70 MG tablet, Take 1 tablet by mouth every 7 days. For bone (Patient taking differently: Take 70 mg by mouth Every 7 (Seven) Days. For bone. Pt took it last on Friday 1/6/17), Disp: 4 tablet, Rfl: 11  •  celecoxib (CELEBREX) 200 MG capsule, Take 1 capsule by mouth Daily., Disp: 30 capsule, Rfl: 5  •  cetirizine (ZyrTEC) 10 MG chewable tablet, Chew 10 mg daily., Disp: , Rfl:   •  hydrocortisone 2.5 % lotion, Apply  topically., Disp: , Rfl:   •  hydrOXYzine (ATARAX) 50 MG tablet, Take 1 tablet by mouth 3 (Three) Times a Day As Needed for Itching for up to 10 days., Disp: 30 tablet, Rfl: 0  •  hyoscyamine (LEVBID) 0.375 MG 12 hr tablet, Take 1 tablet by mouth Every 12 (Twelve) Hours As Needed for Cramping. PRN Abd cramping and  "IBS, Disp: 60 tablet, Rfl: 5  •  lidocaine-hydrocortisone 3-0.5 % cream rectal cream, Change order to this dose and still BID prn, Disp: 85 g, Rfl: 2  •  risperiDONE (risperDAL) 1 MG tablet, Take 1 tablet by mouth Daily., Disp: 30 tablet, Rfl: 5  •  simethicone (MYLICON) 80 MG chewable tablet, Chew 1 tablet 4 (Four) Times a Day As Needed for flatulence., Disp: 30 tablet, Rfl: 0.  she denies medication side effects.    She has not been on her BP meds x 2 weeks. She stopped her Zoloft and is having some increased sx, too. Also has been out of the Risperdal for some time, too.      All of the chronic condition(s) listed above are stable w/o issues.    /100  Pulse 92  Temp 98.3 °F (36.8 °C) (Oral)   Resp 16  Ht 162.6 cm (64\")  Wt 68 kg (150 lb)  SpO2 96%  BMI 25.75 kg/m2    Results for orders placed or performed during the hospital encounter of 01/08/17   Clostridium Difficile Toxin, PCR   Result Value Ref Range    C. Difficile Toxins by PCR Positive (C) Negative   Stool Culture   Result Value Ref Range    Stool Culture       No Salmonella, Shigella, Campylobacter or E coli O157:H7 isolated.    Stool Culture Normal Fecal Felicia    Comprehensive Metabolic Panel   Result Value Ref Range    Glucose 118 (H) 65 - 99 mg/dL    BUN 6 6 - 20 mg/dL    Creatinine 0.72 0.57 - 1.00 mg/dL    Sodium 140 136 - 145 mmol/L    Potassium 4.0 3.5 - 5.2 mmol/L    Chloride 100 98 - 107 mmol/L    CO2 26.6 22.0 - 29.0 mmol/L    Calcium 9.1 8.6 - 10.5 mg/dL    Total Protein 6.7 6.0 - 8.5 g/dL    Albumin 4.00 3.50 - 5.20 g/dL    ALT (SGPT) 13 1 - 33 U/L    AST (SGOT) 15 1 - 32 U/L    Alkaline Phosphatase 67 39 - 117 U/L    Total Bilirubin 0.3 0.1 - 1.2 mg/dL    eGFR Non African Amer 83 >60 mL/min/1.73    Globulin 2.7 gm/dL    A/G Ratio 1.5 g/dL    BUN/Creatinine Ratio 8.3 7.0 - 25.0    Anion Gap 13.4 mmol/L   Urinalysis With / Culture If Indicated   Result Value Ref Range    Color, UA Yellow Yellow, Straw    Appearance, UA Clear Clear "    pH, UA 6.5 5.0 - 8.0    Specific Gravity, UA >=1.030 1.005 - 1.030    Glucose, UA Negative Negative    Ketones, UA Negative Negative    Bilirubin, UA Negative Negative    Blood, UA Trace (A) Negative    Protein, UA Negative Negative    Leuk Esterase, UA Negative Negative    Nitrite, UA Negative Negative    Urobilinogen, UA 0.2 E.U./dL 0.2 - 1.0 E.U./dL   Lipase   Result Value Ref Range    Lipase 20 13 - 60 U/L   CBC Auto Differential   Result Value Ref Range    WBC 15.43 (H) 4.50 - 10.70 10*3/mm3    RBC 4.33 3.90 - 5.20 10*6/mm3    Hemoglobin 12.9 11.9 - 15.5 g/dL    Hematocrit 41.7 35.6 - 45.5 %    MCV 96.3 80.5 - 98.2 fL    MCH 29.8 26.9 - 32.0 pg    MCHC 30.9 (L) 32.4 - 36.3 g/dL    RDW 13.2 (H) 11.7 - 13.0 %    RDW-SD 46.0 37.0 - 54.0 fl    MPV 9.4 6.0 - 12.0 fL    Platelets 479 140 - 500 10*3/mm3    Neutrophil % 80.4 (H) 42.7 - 76.0 %    Lymphocyte % 13.0 (L) 19.6 - 45.3 %    Monocyte % 5.2 5.0 - 12.0 %    Eosinophil % 0.7 0.3 - 6.2 %    Basophil % 0.4 0.0 - 1.5 %    Immature Grans % 0.3 0.0 - 0.5 %    Neutrophils, Absolute 12.40 (H) 1.90 - 8.10 10*3/mm3    Lymphocytes, Absolute 2.01 0.90 - 4.80 10*3/mm3    Monocytes, Absolute 0.81 0.20 - 1.20 10*3/mm3    Eosinophils, Absolute 0.11 0.00 - 0.70 10*3/mm3    Basophils, Absolute 0.06 0.00 - 0.20 10*3/mm3    Immature Grans, Absolute 0.04 (H) 0.00 - 0.03 10*3/mm3   Urinalysis, Microscopic Only   Result Value Ref Range    RBC, UA 0-2 (A) None Seen /HPF    WBC, UA 0-2 (A) None Seen /HPF    Bacteria, UA None Seen None Seen /HPF    Squamous Epithelial Cells, UA 3-6 (A) None Seen, 0-2 /HPF    Hyaline Casts, UA None Seen None Seen /LPF    Methodology Manual Light Microscopy    CBC Auto Differential   Result Value Ref Range    WBC 7.55 4.50 - 10.70 10*3/mm3    RBC 3.64 (L) 3.90 - 5.20 10*6/mm3    Hemoglobin 11.0 (L) 11.9 - 15.5 g/dL    Hematocrit 35.1 (L) 35.6 - 45.5 %    MCV 96.4 80.5 - 98.2 fL    MCH 30.2 26.9 - 32.0 pg    MCHC 31.3 (L) 32.4 - 36.3 g/dL    RDW 13.2 (H)  11.7 - 13.0 %    RDW-SD 46.1 37.0 - 54.0 fl    MPV 9.5 6.0 - 12.0 fL    Platelets 360 140 - 500 10*3/mm3    Neutrophil % 66.6 42.7 - 76.0 %    Lymphocyte % 23.3 19.6 - 45.3 %    Monocyte % 6.8 5.0 - 12.0 %    Eosinophil % 2.5 0.3 - 6.2 %    Basophil % 0.5 0.0 - 1.5 %    Immature Grans % 0.3 0.0 - 0.5 %    Neutrophils, Absolute 5.03 1.90 - 8.10 10*3/mm3    Lymphocytes, Absolute 1.76 0.90 - 4.80 10*3/mm3    Monocytes, Absolute 0.51 0.20 - 1.20 10*3/mm3    Eosinophils, Absolute 0.19 0.00 - 0.70 10*3/mm3    Basophils, Absolute 0.04 0.00 - 0.20 10*3/mm3    Immature Grans, Absolute 0.02 0.00 - 0.03 10*3/mm3   Basic Metabolic Panel   Result Value Ref Range    Glucose 196 (H) 65 - 99 mg/dL    BUN 5 (L) 6 - 20 mg/dL    Creatinine 0.68 0.57 - 1.00 mg/dL    Sodium 141 136 - 145 mmol/L    Potassium 3.5 3.5 - 5.2 mmol/L    Chloride 105 98 - 107 mmol/L    CO2 22.6 22.0 - 29.0 mmol/L    Calcium 8.2 (L) 8.6 - 10.5 mg/dL    eGFR Non African Amer 89 >60 mL/min/1.73    BUN/Creatinine Ratio 7.4 7.0 - 25.0    Anion Gap 13.4 mmol/L   Lipase   Result Value Ref Range    Lipase 25 13 - 60 U/L           The following portions of the patient's history were reviewed and updated as appropriate: allergies, current medications, past family history, past medical history, past social history, past surgical history and problem list.    Review of Systems   Constitutional: Negative for activity change, chills, fatigue and fever.   Respiratory: Negative for cough and chest tightness.    Cardiovascular: Negative for chest pain and palpitations.   Gastrointestinal: Negative for abdominal pain and nausea.   Endocrine: Negative for cold intolerance.   Musculoskeletal: Positive for myalgias.   Psychiatric/Behavioral: Positive for confusion and dysphoric mood. Negative for behavioral problems. The patient is nervous/anxious.        Objective   Physical Exam   Constitutional: She appears well-developed and well-nourished.   Neck: Neck supple. No thyromegaly  present.   Cardiovascular: Normal rate and regular rhythm.    No murmur heard.  Pulmonary/Chest: Effort normal and breath sounds normal.   Abdominal: Bowel sounds are normal.   Psychiatric: She has a normal mood and affect. Her behavior is normal.   Nursing note and vitals reviewed.      Assessment/Plan   Elif was seen today for fibromyalgia and hypertension.    Diagnoses and all orders for this visit:    Schizoaffective disorder, bipolar type  -     risperiDONE (risperDAL) 1 MG tablet; Take 1 tablet by mouth Daily.    Benign essential hypertension  -     lisinopril (PRINIVIL,ZESTRIL) 10 MG tablet; Take 1 tablet by mouth Daily.  -     Comprehensive metabolic panel  -     Lipid panel  -     CBC and Differential  -     TSH    Abdominal cramping  -     hyoscyamine (LEVBID) 0.375 MG 12 hr tablet; Take 1 tablet by mouth Every 12 (Twelve) Hours As Needed for Cramping. PRN Abd cramping and IBS    Fibromyalgia  -     celecoxib (CELEBREX) 200 MG capsule; Take 1 capsule by mouth Daily.    IFG (impaired fasting glucose)  -     Hemoglobin A1c    Other orders  -     Cancel: TSH  -     Cancel: Comprehensive Metabolic Panel  -     Cancel: CBC & Differential

## 2018-02-16 LAB
ALBUMIN SERPL-MCNC: 4.5 G/DL (ref 3.5–5.2)
ALBUMIN/GLOB SERPL: 2 G/DL
ALP SERPL-CCNC: 76 U/L (ref 39–117)
ALT SERPL-CCNC: 36 U/L (ref 1–33)
AST SERPL-CCNC: 33 U/L (ref 1–32)
BASOPHILS # BLD AUTO: 0.1 10*3/MM3 (ref 0–0.2)
BASOPHILS NFR BLD AUTO: 1.1 % (ref 0–1.5)
BILIRUB SERPL-MCNC: 0.4 MG/DL (ref 0.1–1.2)
BUN SERPL-MCNC: 10 MG/DL (ref 8–23)
BUN/CREAT SERPL: 14.9 (ref 7–25)
CALCIUM SERPL-MCNC: 9.6 MG/DL (ref 8.6–10.5)
CHLORIDE SERPL-SCNC: 101 MMOL/L (ref 98–107)
CHOLEST SERPL-MCNC: 201 MG/DL (ref 0–200)
CO2 SERPL-SCNC: 25.9 MMOL/L (ref 22–29)
CREAT SERPL-MCNC: 0.67 MG/DL (ref 0.57–1)
EOSINOPHIL # BLD AUTO: 0.09 10*3/MM3 (ref 0–0.7)
EOSINOPHIL NFR BLD AUTO: 1 % (ref 0.3–6.2)
ERYTHROCYTE [DISTWIDTH] IN BLOOD BY AUTOMATED COUNT: 13.1 % (ref 11.7–13)
GFR SERPLBLD CREATININE-BSD FMLA CKD-EPI: 109 ML/MIN/1.73
GFR SERPLBLD CREATININE-BSD FMLA CKD-EPI: 90 ML/MIN/1.73
GLOBULIN SER CALC-MCNC: 2.2 GM/DL
GLUCOSE SERPL-MCNC: 105 MG/DL (ref 65–99)
HBA1C MFR BLD: 6.2 % (ref 4.8–5.6)
HCT VFR BLD AUTO: 43.2 % (ref 35.6–45.5)
HDLC SERPL-MCNC: 57 MG/DL (ref 40–60)
HGB BLD-MCNC: 14 G/DL (ref 11.9–15.5)
IMM GRANULOCYTES # BLD: 0 10*3/MM3 (ref 0–0.03)
IMM GRANULOCYTES NFR BLD: 0 % (ref 0–0.5)
LDLC SERPL CALC-MCNC: 106 MG/DL (ref 0–100)
LYMPHOCYTES # BLD AUTO: 2.61 10*3/MM3 (ref 0.9–4.8)
LYMPHOCYTES NFR BLD AUTO: 29.1 % (ref 19.6–45.3)
MCH RBC QN AUTO: 31.7 PG (ref 26.9–32)
MCHC RBC AUTO-ENTMCNC: 32.4 G/DL (ref 32.4–36.3)
MCV RBC AUTO: 98 FL (ref 80.5–98.2)
MONOCYTES # BLD AUTO: 0.6 10*3/MM3 (ref 0.2–1.2)
MONOCYTES NFR BLD AUTO: 6.7 % (ref 5–12)
NEUTROPHILS # BLD AUTO: 5.56 10*3/MM3 (ref 1.9–8.1)
NEUTROPHILS NFR BLD AUTO: 62.1 % (ref 42.7–76)
PLATELET # BLD AUTO: 364 10*3/MM3 (ref 140–500)
POTASSIUM SERPL-SCNC: 4.3 MMOL/L (ref 3.5–5.2)
PROT SERPL-MCNC: 6.7 G/DL (ref 6–8.5)
RBC # BLD AUTO: 4.41 10*6/MM3 (ref 3.9–5.2)
SODIUM SERPL-SCNC: 143 MMOL/L (ref 136–145)
TRIGL SERPL-MCNC: 190 MG/DL (ref 0–150)
TSH SERPL DL<=0.005 MIU/L-ACNC: 0.79 MIU/ML (ref 0.27–4.2)
VLDLC SERPL CALC-MCNC: 38 MG/DL (ref 5–40)
WBC # BLD AUTO: 8.96 10*3/MM3 (ref 4.5–10.7)

## 2018-05-17 ENCOUNTER — TELEPHONE (OUTPATIENT)
Dept: FAMILY MEDICINE CLINIC | Facility: CLINIC | Age: 63
End: 2018-05-17

## 2018-05-17 DIAGNOSIS — K64.8 OTHER HEMORRHOIDS: Primary | ICD-10-CM

## 2018-09-19 ENCOUNTER — OFFICE VISIT (OUTPATIENT)
Dept: FAMILY MEDICINE CLINIC | Facility: CLINIC | Age: 63
End: 2018-09-19

## 2018-09-19 VITALS
SYSTOLIC BLOOD PRESSURE: 148 MMHG | HEART RATE: 96 BPM | BODY MASS INDEX: 26.29 KG/M2 | DIASTOLIC BLOOD PRESSURE: 94 MMHG | HEIGHT: 64 IN | OXYGEN SATURATION: 97 % | RESPIRATION RATE: 16 BRPM | WEIGHT: 154 LBS | TEMPERATURE: 97.9 F

## 2018-09-19 DIAGNOSIS — I10 BENIGN ESSENTIAL HYPERTENSION: ICD-10-CM

## 2018-09-19 DIAGNOSIS — L02.91 ABSCESS: Primary | ICD-10-CM

## 2018-09-19 DIAGNOSIS — F41.9 ANXIETY: ICD-10-CM

## 2018-09-19 PROCEDURE — 99214 OFFICE O/P EST MOD 30 MIN: CPT | Performed by: NURSE PRACTITIONER

## 2018-09-19 RX ORDER — CEPHALEXIN 500 MG/1
500 CAPSULE ORAL 3 TIMES DAILY
Qty: 30 CAPSULE | Refills: 0 | Status: SHIPPED | OUTPATIENT
Start: 2018-09-19 | End: 2019-02-21

## 2018-09-19 RX ORDER — HYDROXYZINE HYDROCHLORIDE 25 MG/1
25 TABLET, FILM COATED ORAL 3 TIMES DAILY PRN
Qty: 30 TABLET | Refills: 0 | Status: SHIPPED | OUTPATIENT
Start: 2018-09-19 | End: 2019-05-10

## 2018-09-19 RX ORDER — LISINOPRIL 20 MG/1
20 TABLET ORAL DAILY
Qty: 30 TABLET | Refills: 0 | Status: SHIPPED | OUTPATIENT
Start: 2018-09-19 | End: 2019-05-10 | Stop reason: SDUPTHER

## 2018-09-19 NOTE — PROGRESS NOTES
Subjective   Elif Cardona is a 62 y.o. female.     History of Present Illness   Elif Cardona 62 y.o. female presents for evaluation of a rash involving the groin. Rash has been present for: several days. Lesions are erythematous, and are described as boil like. Rash has not changed over time. Rash is painful. Associated symptoms  .none.  Patient denies:spreading of the erythema..  Treatment to date includes: none without improvement. Symptoms are unchanged. Patient has not had contacts with similar rash. Patient has not had new exposures (soaps, lotions, laundry detergents, foods, medications, plants, insects or animals).      Also reports anxiety.  Wanted clonazepam. Discussed this would not be prescribed.  Will refill hydroxyzine.   The following portions of the patient's history were reviewed and updated as appropriate: allergies, current medications, past family history, past medical history, past social history, past surgical history and problem list.    Review of Systems   Constitutional: Negative for chills, fever and unexpected weight change.   Respiratory: Negative for shortness of breath.    Cardiovascular: Negative for chest pain and palpitations.   Skin: Positive for wound. Negative for color change.   Psychiatric/Behavioral: Negative for behavioral problems, self-injury, sleep disturbance and suicidal ideas. The patient is nervous/anxious.        Objective   Physical Exam   Constitutional: She is oriented to person, place, and time. She appears well-developed and well-nourished.   Pulmonary/Chest: Effort normal.   Neurological: She is alert and oriented to person, place, and time.   Skin:        2 cm area of mildly fluctuant erythema to left groin. No drainage noted.     Psychiatric: She has a normal mood and affect. Judgment normal.   Nursing note and vitals reviewed.      Assessment/Plan   Elif was seen today for abscess.    Diagnoses and all orders for this visit:    Abscess  -     cephalexin  (KEFLEX) 500 MG capsule; Take 1 capsule by mouth 3 (Three) Times a Day.    Benign essential hypertension  -     lisinopril (PRINIVIL,ZESTRIL) 20 MG tablet; Take 1 tablet by mouth Daily.    Anxiety  -     hydrOXYzine (ATARAX) 25 MG tablet; Take 1 tablet by mouth 3 (Three) Times a Day As Needed for Anxiety for up to 10 days.      BP elevated. Will increase dose to 20 mg. She will f/u in 2 weeks.

## 2018-10-24 ENCOUNTER — FLU SHOT (OUTPATIENT)
Dept: FAMILY MEDICINE CLINIC | Facility: CLINIC | Age: 63
End: 2018-10-24

## 2018-10-24 DIAGNOSIS — Z23 FLU VACCINE NEED: ICD-10-CM

## 2018-10-24 PROCEDURE — 90674 CCIIV4 VAC NO PRSV 0.5 ML IM: CPT | Performed by: FAMILY MEDICINE

## 2018-10-24 PROCEDURE — 90471 IMMUNIZATION ADMIN: CPT | Performed by: FAMILY MEDICINE

## 2018-12-27 ENCOUNTER — OFFICE VISIT (OUTPATIENT)
Dept: RETAIL CLINIC | Facility: CLINIC | Age: 63
End: 2018-12-27

## 2018-12-27 VITALS
RESPIRATION RATE: 18 BRPM | OXYGEN SATURATION: 98 % | TEMPERATURE: 98.4 F | DIASTOLIC BLOOD PRESSURE: 82 MMHG | SYSTOLIC BLOOD PRESSURE: 148 MMHG | HEART RATE: 94 BPM

## 2018-12-27 DIAGNOSIS — N30.01 ACUTE CYSTITIS WITH HEMATURIA: Primary | ICD-10-CM

## 2018-12-27 LAB
BILIRUB BLD-MCNC: NEGATIVE MG/DL
CLARITY, POC: ABNORMAL
COLOR UR: YELLOW
GLUCOSE UR STRIP-MCNC: NEGATIVE MG/DL
KETONES UR QL: ABNORMAL
LEUKOCYTE EST, POC: ABNORMAL
NITRITE UR-MCNC: NEGATIVE MG/ML
PH UR: 6 [PH] (ref 5–8)
PROT UR STRIP-MCNC: ABNORMAL MG/DL
RBC # UR STRIP: ABNORMAL /UL
SP GR UR: 1.03 (ref 1–1.03)
UROBILINOGEN UR QL: NORMAL

## 2018-12-27 PROCEDURE — 99213 OFFICE O/P EST LOW 20 MIN: CPT | Performed by: NURSE PRACTITIONER

## 2018-12-27 PROCEDURE — 81003 URINALYSIS AUTO W/O SCOPE: CPT | Performed by: NURSE PRACTITIONER

## 2018-12-27 RX ORDER — NITROFURANTOIN 25; 75 MG/1; MG/1
100 CAPSULE ORAL 2 TIMES DAILY
Qty: 14 CAPSULE | Refills: 0 | Status: SHIPPED | OUTPATIENT
Start: 2018-12-27 | End: 2019-01-03

## 2018-12-27 RX ORDER — PHENAZOPYRIDINE HYDROCHLORIDE 200 MG/1
200 TABLET, FILM COATED ORAL 3 TIMES DAILY PRN
Qty: 6 TABLET | Refills: 0 | Status: SHIPPED | OUTPATIENT
Start: 2018-12-27 | End: 2018-12-29

## 2018-12-27 NOTE — PATIENT INSTRUCTIONS
Urinary Tract Infection, Adult  A urinary tract infection (UTI) is an infection of any part of the urinary tract. The urinary tract includes the:  · Kidneys.  · Ureters.  · Bladder.  · Urethra.    These organs make, store, and get rid of pee (urine) in the body.  Follow these instructions at home:  · Take over-the-counter and prescription medicines only as told by your doctor.  · If you were prescribed an antibiotic medicine, take it as told by your doctor. Do not stop taking the antibiotic even if you start to feel better.  · Avoid the following drinks:  ? Alcohol.  ? Caffeine.  ? Tea.  ? Carbonated drinks.  · Drink enough fluid to keep your pee clear or pale yellow.  · Keep all follow-up visits as told by your doctor. This is important.  · Make sure to:  ? Empty your bladder often and completely. Do not to hold pee for long periods of time.  ? Empty your bladder before and after sex.  ? Wipe from front to back after a bowel movement if you are female. Use each tissue one time when you wipe.  Contact a doctor if:  · You have back pain.  · You have a fever.  · You feel sick to your stomach (nauseous).  · You throw up (vomit).  · Your symptoms do not get better after 3 days.  · Your symptoms go away and then come back.  Get help right away if:  · You have very bad back pain.  · You have very bad lower belly (abdominal) pain.  · You are throwing up and cannot keep down any medicines or water.  This information is not intended to replace advice given to you by your health care provider. Make sure you discuss any questions you have with your health care provider.  Document Released: 06/05/2009 Document Revised: 05/25/2017 Document Reviewed: 11/07/2016  [employer] Interactive Patient Education © 2018 [employer]

## 2018-12-27 NOTE — PROGRESS NOTES
Subjective   Elif Cardona is a 63 y.o. female.     Urinary Tract Infection    This is a new problem. The current episode started in the past 7 days. The problem has been gradually worsening. The quality of the pain is described as burning. The pain is at a severity of 4/10. There has been no fever. She is sexually active. Associated symptoms include urgency. Pertinent negatives include no flank pain, hematuria or nausea. Associated symptoms comments: Pressure in vaginal area, loss control of bladder once a few days ago    . She has tried acetaminophen (hycosamine) for the symptoms. The treatment provided no relief. There is no history of kidney stones or recurrent UTIs.        The following portions of the patient's history were reviewed and updated as appropriate: allergies, current medications, past family history, past medical history, past social history, past surgical history and problem list.    Review of Systems   HENT: Negative.    Respiratory: Negative.    Cardiovascular: Negative.    Gastrointestinal: Negative for nausea.   Genitourinary: Positive for urgency. Negative for flank pain and hematuria.   Neurological: Negative.        Objective   Physical Exam   Constitutional: She is cooperative. No distress.   HENT:   Head: Normocephalic.   Right Ear: Hearing, tympanic membrane, external ear and ear canal normal.   Left Ear: Hearing, tympanic membrane, external ear and ear canal normal.   Nose: Nose normal.   Mouth/Throat: Oropharynx is clear and moist.   Eyes: Conjunctivae, EOM and lids are normal. Pupils are equal, round, and reactive to light.   Neck: Trachea normal and full passive range of motion without pain.   Cardiovascular: Normal rate, regular rhythm and normal pulses.   Pulmonary/Chest: Effort normal and breath sounds normal.   Abdominal: There is tenderness in the suprapubic area. There is no CVA tenderness.   Neurological: She is alert.   Skin: Skin is warm. Capillary refill takes less than 2  seconds.   Psychiatric: She has a normal mood and affect. Her speech is normal and behavior is normal.   Vitals reviewed.        Assessment/Plan   Elif was seen today for urinary tract infection.    Diagnoses and all orders for this visit:    Acute cystitis with hematuria  -     POC Urinalysis Dipstick, Automated    Other orders  -     nitrofurantoin, macrocrystal-monohydrate, (MACROBID) 100 MG capsule; Take 1 capsule by mouth 2 (Two) Times a Day for 7 days.  -     phenazopyridine (PYRIDIUM) 200 MG tablet; Take 1 tablet by mouth 3 (Three) Times a Day As Needed for bladder spasms for up to 2 days.

## 2019-02-21 ENCOUNTER — OFFICE VISIT (OUTPATIENT)
Dept: RETAIL CLINIC | Facility: CLINIC | Age: 64
End: 2019-02-21

## 2019-02-21 VITALS
SYSTOLIC BLOOD PRESSURE: 150 MMHG | DIASTOLIC BLOOD PRESSURE: 80 MMHG | HEART RATE: 89 BPM | RESPIRATION RATE: 18 BRPM | TEMPERATURE: 98.6 F | OXYGEN SATURATION: 96 %

## 2019-02-21 DIAGNOSIS — H66.003 ACUTE SUPPURATIVE OTITIS MEDIA OF BOTH EARS WITHOUT SPONTANEOUS RUPTURE OF TYMPANIC MEMBRANES, RECURRENCE NOT SPECIFIED: ICD-10-CM

## 2019-02-21 DIAGNOSIS — J02.9 SORE THROAT: Primary | ICD-10-CM

## 2019-02-21 LAB
EXPIRATION DATE: NORMAL
INTERNAL CONTROL: NORMAL
Lab: NORMAL
S PYO AG THROAT QL: NEGATIVE

## 2019-02-21 PROCEDURE — 99213 OFFICE O/P EST LOW 20 MIN: CPT | Performed by: NURSE PRACTITIONER

## 2019-02-21 PROCEDURE — 87880 STREP A ASSAY W/OPTIC: CPT | Performed by: NURSE PRACTITIONER

## 2019-02-21 RX ORDER — CEFDINIR 300 MG/1
300 CAPSULE ORAL 2 TIMES DAILY
Qty: 20 CAPSULE | Refills: 0 | Status: SHIPPED | OUTPATIENT
Start: 2019-02-21 | End: 2019-03-03

## 2019-02-21 RX ORDER — LORATADINE 10 MG/1
CAPSULE, LIQUID FILLED ORAL
COMMUNITY
End: 2022-01-01

## 2019-02-21 NOTE — PROGRESS NOTES
Subjective   Elif Cardona is a 63 y.o. female.     Sore Throat    This is a new problem. The current episode started in the past 7 days. The problem has been gradually worsening. Maximum temperature: felt flush, unknown. Associated symptoms include congestion, coughing and headaches (mild). Pertinent negatives include no ear pain or vomiting. Associated symptoms comments: Achiness, fatigue. Treatments tried: mucinex, dayquil, nyquil. The treatment provided no relief.   Sinusitis   This is a new problem. The current episode started in the past 7 days. The problem has been gradually worsening since onset. Associated symptoms include congestion, coughing, headaches (mild) and a sore throat. Pertinent negatives include no ear pain.        The following portions of the patient's history were reviewed and updated as appropriate: allergies, current medications, past family history, past medical history, past social history, past surgical history and problem list.    Review of Systems   HENT: Positive for congestion and sore throat. Negative for ear pain.    Respiratory: Positive for cough.    Gastrointestinal: Negative.  Negative for vomiting.   Musculoskeletal: Positive for myalgias.        Achiness     Neurological: Positive for headache.       Objective   Physical Exam   Constitutional: She is cooperative. No distress.   HENT:   Head: Normocephalic.   Right Ear: Hearing, external ear and ear canal normal. Tympanic membrane is erythematous. A middle ear effusion is present.   Left Ear: Hearing, external ear and ear canal normal. Tympanic membrane is erythematous. A middle ear effusion is present.   Nose: Mucosal edema and congestion present.   Mouth/Throat: Posterior oropharyngeal erythema present.   Eyes: Conjunctivae, EOM and lids are normal. Pupils are equal, round, and reactive to light.   Neck: Trachea normal and full passive range of motion without pain.   Cardiovascular: Normal rate, regular rhythm and normal  pulses.   Pulmonary/Chest: Effort normal. She has wheezes (expiratory) in the right upper field.   Lymphadenopathy:     She has cervical adenopathy.        Right cervical: Superficial cervical adenopathy present.        Left cervical: Superficial cervical adenopathy present.   Neurological: She is alert.   Skin: Skin is warm. Capillary refill takes less than 2 seconds.   Psychiatric: She has a normal mood and affect. Her speech is normal and behavior is normal.   Vitals reviewed.        Assessment/Plan   Elif was seen today for sore throat and sinusitis.    Diagnoses and all orders for this visit:    Sore throat  -     POC Rapid Strep A    Acute suppurative otitis media of both ears without spontaneous rupture of tympanic membranes, recurrence not specified    Other orders  -     cefdinir (OMNICEF) 300 MG capsule; Take 1 capsule by mouth 2 (Two) Times a Day for 10 days.

## 2019-02-21 NOTE — PATIENT INSTRUCTIONS
"Upper Respiratory Infection, Adult  Most upper respiratory infections (URIs) are a viral infection of the air passages leading to the lungs. A URI affects the nose, throat, and upper air passages. The most common type of URI is nasopharyngitis and is typically referred to as \"the common cold.\"  URIs run their course and usually go away on their own. Most of the time, a URI does not require medical attention, but sometimes a bacterial infection in the upper airways can follow a viral infection. This is called a secondary infection. Sinus and middle ear infections are common types of secondary upper respiratory infections.  Bacterial pneumonia can also complicate a URI. A URI can worsen asthma and chronic obstructive pulmonary disease (COPD). Sometimes, these complications can require emergency medical care and may be life threatening.  What are the causes?  Almost all URIs are caused by viruses. A virus is a type of germ and can spread from one person to another.  What increases the risk?  You may be at risk for a URI if:  · You smoke.  · You have chronic heart or lung disease.  · You have a weakened defense (immune) system.  · You are very young or very old.  · You have nasal allergies or asthma.  · You work in crowded or poorly ventilated areas.  · You work in health care facilities or schools.    What are the signs or symptoms?  Symptoms typically develop 2-3 days after you come in contact with a cold virus. Most viral URIs last 7-10 days. However, viral URIs from the influenza virus (flu virus) can last 14-18 days and are typically more severe. Symptoms may include:  · Runny or stuffy (congested) nose.  · Sneezing.  · Cough.  · Sore throat.  · Headache.  · Fatigue.  · Fever.  · Loss of appetite.  · Pain in your forehead, behind your eyes, and over your cheekbones (sinus pain).  · Muscle aches.    How is this diagnosed?  Your health care provider may diagnose a URI by:  · Physical exam.  · Tests to check that your " symptoms are not due to another condition such as:  ? Strep throat.  ? Sinusitis.  ? Pneumonia.  ? Asthma.    How is this treated?  A URI goes away on its own with time. It cannot be cured with medicines, but medicines may be prescribed or recommended to relieve symptoms. Medicines may help:  · Reduce your fever.  · Reduce your cough.  · Relieve nasal congestion.    Follow these instructions at home:  · Take medicines only as directed by your health care provider.  · Gargle warm saltwater or take cough drops to comfort your throat as directed by your health care provider.  · Use a warm mist humidifier or inhale steam from a shower to increase air moisture. This may make it easier to breathe.  · Drink enough fluid to keep your urine clear or pale yellow.  · Eat soups and other clear broths and maintain good nutrition.  · Rest as needed.  · Return to work when your temperature has returned to normal or as your health care provider advises. You may need to stay home longer to avoid infecting others. You can also use a face mask and careful hand washing to prevent spread of the virus.  · Increase the usage of your inhaler if you have asthma.  · Do not use any tobacco products, including cigarettes, chewing tobacco, or electronic cigarettes. If you need help quitting, ask your health care provider.  How is this prevented?  The best way to protect yourself from getting a cold is to practice good hygiene.  · Avoid oral or hand contact with people with cold symptoms.  · Wash your hands often if contact occurs.    There is no clear evidence that vitamin C, vitamin E, echinacea, or exercise reduces the chance of developing a cold. However, it is always recommended to get plenty of rest, exercise, and practice good nutrition.  Contact a health care provider if:  · You are getting worse rather than better.  · Your symptoms are not controlled by medicine.  · You have chills.  · You have worsening shortness of breath.  · You have  brown or red mucus.  · You have yellow or brown nasal discharge.  · You have pain in your face, especially when you bend forward.  · You have a fever.  · You have swollen neck glands.  · You have pain while swallowing.  · You have white areas in the back of your throat.  Get help right away if:  · You have severe or persistent:  ? Headache.  ? Ear pain.  ? Sinus pain.  ? Chest pain.  · You have chronic lung disease and any of the following:  ? Wheezing.  ? Prolonged cough.  ? Coughing up blood.  ? A change in your usual mucus.  · You have a stiff neck.  · You have changes in your:  ? Vision.  ? Hearing.  ? Thinking.  ? Mood.  This information is not intended to replace advice given to you by your health care provider. Make sure you discuss any questions you have with your health care provider.  Document Released: 06/13/2002 Document Revised: 08/20/2017 Document Reviewed: 03/25/2015  [employer] Interactive Patient Education © 2018 [employer]  Otitis Media, Adult  Otitis media occurs when there is inflammation and fluid in the middle ear. Your middle ear is a part of the ear that contains bones for hearing as well as air that helps send sounds to your brain.  What are the causes?  This condition is caused by a blockage in the eustachian tube. This tube drains fluid from the ear to the back of the nose (nasopharynx). A blockage in this tube can be caused by an object or by swelling (edema) in the tube. Problems that can cause a blockage include:  · A cold or other upper respiratory infection.  · Allergies.  · An irritant, such as tobacco smoke.  · Enlarged adenoids. The adenoids are areas of soft tissue located high in the back of the throat, behind the nose and the roof of the mouth.  · A mass in the nasopharynx.  · Damage to the ear caused by pressure changes (barotrauma).    What are the signs or symptoms?  Symptoms of this condition include:  · Ear pain.  · A fever.  · Decreased hearing.  · A  headache.  · Tiredness (lethargy).  · Fluid leaking from the ear.  · Ringing in the ear.    How is this diagnosed?  This condition is diagnosed with a physical exam. During the exam your health care provider will use an instrument called an otoscope to look into your ear and check for redness, swelling, and fluid. He or she will also ask about your symptoms.  Your health care provider may also order tests, such as:  · A test to check the movement of the eardrum (pneumatic otoscopy). This test is done by squeezing a small amount of air into the ear.  · A test that changes air pressure in the middle ear to check how well the eardrum moves and whether the eustachian tube is working (tympanogram).    How is this treated?  This condition usually goes away on its own within 3-5 days. But if the condition is caused by a bacteria infection and does not go away own its own, or keeps coming back, your health care provider may:  · Prescribe antibiotic medicines to treat the infection.  · Prescribe or recommend medicines to control pain.    Follow these instructions at home:  · Take over-the-counter and prescription medicines only as told by your health care provider.  · If you were prescribed an antibiotic medicine, take it as told by your health care provider. Do not stop taking the antibiotic even if you start to feel better.  · Keep all follow-up visits as told by your health care provider. This is important.  Contact a health care provider if:  · You have bleeding from your nose.  · There is a lump on your neck.  · You are not getting better in 5 days.  · You feel worse instead of better.  Get help right away if:  · You have severe pain that is not controlled with medicine.  · You have swelling, redness, or pain around your ear.  · You have stiffness in your neck.  · A part of your face is paralyzed.  · The bone behind your ear (mastoid) is tender when you touch it.  · You develop a severe headache.  Summary  · Otitis  media is redness, soreness, and swelling of the middle ear.  · This condition usually goes away on its own within 3-5 days.  · If the problem does not go away in 3-5 days, your health care provider may prescribe or recommend medicines to treat your symptoms.  · If you were prescribed an antibiotic medicine, take it as told by your health care provider.  This information is not intended to replace advice given to you by your health care provider. Make sure you discuss any questions you have with your health care provider.  Document Released: 09/22/2005 Document Revised: 12/08/2017 Document Reviewed: 12/08/2017  [employer] Interactive Patient Education © 2018 [employer]

## 2019-05-10 ENCOUNTER — OFFICE VISIT (OUTPATIENT)
Dept: FAMILY MEDICINE CLINIC | Facility: CLINIC | Age: 64
End: 2019-05-10

## 2019-05-10 VITALS
HEIGHT: 65 IN | HEART RATE: 88 BPM | TEMPERATURE: 98.2 F | BODY MASS INDEX: 25.66 KG/M2 | DIASTOLIC BLOOD PRESSURE: 90 MMHG | RESPIRATION RATE: 18 BRPM | OXYGEN SATURATION: 94 % | SYSTOLIC BLOOD PRESSURE: 158 MMHG | WEIGHT: 154 LBS

## 2019-05-10 DIAGNOSIS — I10 BENIGN ESSENTIAL HYPERTENSION: ICD-10-CM

## 2019-05-10 DIAGNOSIS — J20.9 ACUTE BRONCHITIS DUE TO INFECTION: ICD-10-CM

## 2019-05-10 DIAGNOSIS — J01.90 ACUTE SINUSITIS, RECURRENCE NOT SPECIFIED, UNSPECIFIED LOCATION: Primary | ICD-10-CM

## 2019-05-10 DIAGNOSIS — R73.01 IFG (IMPAIRED FASTING GLUCOSE): ICD-10-CM

## 2019-05-10 DIAGNOSIS — Z00.00 LABORATORY EXAMINATION ORDERED AS PART OF A ROUTINE GENERAL MEDICAL EXAMINATION: ICD-10-CM

## 2019-05-10 PROCEDURE — 99213 OFFICE O/P EST LOW 20 MIN: CPT | Performed by: PHYSICIAN ASSISTANT

## 2019-05-10 RX ORDER — PREDNISONE 20 MG/1
20 TABLET ORAL 2 TIMES DAILY
Qty: 10 TABLET | Refills: 0 | Status: SHIPPED | OUTPATIENT
Start: 2019-05-10 | End: 2019-05-16

## 2019-05-10 RX ORDER — LISINOPRIL 20 MG/1
20 TABLET ORAL DAILY
Qty: 30 TABLET | Refills: 1 | Status: SHIPPED | OUTPATIENT
Start: 2019-05-10 | End: 2019-05-16

## 2019-05-10 RX ORDER — AMLODIPINE BESYLATE 5 MG/1
5 TABLET ORAL DAILY
Qty: 30 TABLET | Refills: 1 | Status: SHIPPED | OUTPATIENT
Start: 2019-05-10 | End: 2019-05-16 | Stop reason: SDUPTHER

## 2019-05-10 RX ORDER — CEFDINIR 300 MG/1
CAPSULE ORAL
Qty: 20 CAPSULE | Refills: 0 | Status: SHIPPED | OUTPATIENT
Start: 2019-05-10 | End: 2019-05-16

## 2019-05-10 NOTE — PROGRESS NOTES
Subjective   Elif Cardona is a 63 y.o. female.     History of Present Illness   Elif Cardona 63 y.o. female who presents for evaluation of sinus pressure and congestion, cough. Symptoms include ear pressure, congestion, sneezing, sore throat, post nasal drip, productive cough, cough described as productive of white and yellow sputum, runny nose and wheezing.  Onset of symptoms was 2 weeks ago, unchanged since that time. Patient denies shortness of breath, fever.   Evaluation to date: none Treatment to date:  Mucinex.       The following portions of the patient's history were reviewed and updated as appropriate: allergies, current medications, past family history, past medical history, past social history, past surgical history and problem list.    Review of Systems   Constitutional: Positive for fatigue and unexpected weight change. Negative for activity change.   HENT: Positive for congestion, postnasal drip, sinus pain, sneezing and sore throat. Negative for ear pain.    Eyes: Negative for pain and discharge.   Respiratory: Positive for cough and wheezing. Negative for chest tightness and shortness of breath.    Cardiovascular: Negative for chest pain and palpitations.   Gastrointestinal: Negative for abdominal pain, diarrhea and vomiting.   Endocrine: Negative.    Genitourinary: Negative.    Musculoskeletal: Negative for joint swelling.   Skin: Negative for color change, rash and wound.   Allergic/Immunologic: Positive for environmental allergies.   Neurological: Negative for seizures and syncope.   Psychiatric/Behavioral: Positive for agitation.       Objective   Physical Exam   Constitutional: She is oriented to person, place, and time. She appears well-developed and well-nourished. No distress.   HENT:   Head: Normocephalic and atraumatic.   Eyes: Conjunctivae and EOM are normal. Pupils are equal, round, and reactive to light. Right eye exhibits no discharge. Left eye exhibits no discharge. No scleral  icterus.   Neck: Normal range of motion. Neck supple. No tracheal deviation present. No thyromegaly present.   Cardiovascular: Normal rate, regular rhythm, normal heart sounds, intact distal pulses and normal pulses. Exam reveals no gallop.   No murmur heard.  Pulmonary/Chest: Effort normal and breath sounds normal. No respiratory distress. She has no wheezes. She has no rales.   Musculoskeletal: Normal range of motion.   Neurological: She is alert and oriented to person, place, and time. She exhibits normal muscle tone. Coordination normal.   Skin: Skin is warm. No rash noted. No erythema. No pallor.   Psychiatric: She has a normal mood and affect. Her behavior is normal. Judgment and thought content normal.   Nursing note and vitals reviewed.      Assessment/Plan   Elif was seen today for sinusitis.    Diagnoses and all orders for this visit:    Acute sinusitis, recurrence not specified, unspecified location    Laboratory examination ordered as part of a routine general medical examination  -     Cancel: Comprehensive metabolic panel  -     Cancel: Lipid panel  -     Cancel: CBC and Differential  -     Cancel: TSH  -     Cancel: T3, Free  -     Cancel: T4, Free  -     Cancel: Vitamin B12  -     Cancel: Folate  -     Cancel: Vitamin D 25 Hydroxy  -     Comprehensive metabolic panel  -     Lipid panel  -     CBC and Differential  -     TSH  -     Hemoglobin A1c  -     T4, Free  -     T3, Free  -     Vitamin B12  -     Folate  -     Vitamin D 25 Hydroxy    IFG (impaired fasting glucose)  -     Comprehensive metabolic panel  -     Lipid panel  -     CBC and Differential  -     TSH  -     Hemoglobin A1c  -     T4, Free  -     T3, Free  -     Vitamin B12  -     Folate  -     Vitamin D 25 Hydroxy    here for acute sinus infx and acute bronchitis; will Rx Cefdinir; pred d/t wheezing  I will update labs today  Add Norvasc 5mg for HTN and refilled Lisinopril    Stop smoking

## 2019-05-10 NOTE — PATIENT INSTRUCTIONS
For throat pain:  Can gargle with 1/2 Maalox and 1/2 Benadryl liquid ---mix, gargle and spit Take Tylenol for fever or aches  Rest as needed  Call not better in 7 days  Increase fluids  Call if worse  Can use generic Afrin nasal spray up to 5 days for nasal congestion  Finish antibiotic course of therapy  Low glycemic index diet  Exercise 30 minutes most days of the week  Make sure you get results on any labs or tests we ordered today  We discussed medications and how to take them as prescribed  Sleep 6-8 hours each night if possible  If you have not signed up for [employer], please activate your code ASAP from your After Visit Summary today    LDL goal <100  LDL goal if heart disease <70  HDL goal >60  Triglyceride goal <150  BP goal =<130/80  Fasting glucose <100

## 2019-05-11 LAB
25(OH)D3+25(OH)D2 SERPL-MCNC: 21.4 NG/ML (ref 30–100)
ALBUMIN SERPL-MCNC: 4.4 G/DL (ref 3.5–5.2)
ALBUMIN/GLOB SERPL: 1.8 G/DL
ALP SERPL-CCNC: 84 U/L (ref 39–117)
ALT SERPL-CCNC: 43 U/L (ref 1–33)
AST SERPL-CCNC: 7 U/L (ref 1–32)
BASOPHILS # BLD AUTO: 0.15 10*3/MM3 (ref 0–0.2)
BASOPHILS NFR BLD AUTO: 1.5 % (ref 0–1.5)
BILIRUB SERPL-MCNC: <0.2 MG/DL (ref 0.2–1.2)
BUN SERPL-MCNC: 13 MG/DL (ref 8–23)
BUN/CREAT SERPL: 18.6 (ref 7–25)
CALCIUM SERPL-MCNC: 10.5 MG/DL (ref 8.6–10.5)
CHLORIDE SERPL-SCNC: 100 MMOL/L (ref 98–107)
CHOLEST SERPL-MCNC: 191 MG/DL (ref 0–200)
CO2 SERPL-SCNC: 29.8 MMOL/L (ref 22–29)
CREAT SERPL-MCNC: 0.7 MG/DL (ref 0.57–1)
EOSINOPHIL # BLD AUTO: 0.22 10*3/MM3 (ref 0–0.4)
EOSINOPHIL NFR BLD AUTO: 2.2 % (ref 0.3–6.2)
ERYTHROCYTE [DISTWIDTH] IN BLOOD BY AUTOMATED COUNT: 11.9 % (ref 12.3–15.4)
FOLATE SERPL-MCNC: 9.33 NG/ML (ref 4.78–24.2)
GLOBULIN SER CALC-MCNC: 2.4 GM/DL
GLUCOSE SERPL-MCNC: 157 MG/DL (ref 65–99)
HBA1C MFR BLD: 7.7 % (ref 4.8–5.6)
HCT VFR BLD AUTO: 44.4 % (ref 34–46.6)
HDLC SERPL-MCNC: 43 MG/DL (ref 40–60)
HGB BLD-MCNC: 13.8 G/DL (ref 12–15.9)
IMM GRANULOCYTES # BLD AUTO: 0.04 10*3/MM3 (ref 0–0.05)
IMM GRANULOCYTES NFR BLD AUTO: 0.4 % (ref 0–0.5)
LDLC SERPL CALC-MCNC: 86 MG/DL (ref 0–100)
LYMPHOCYTES # BLD AUTO: 2.08 10*3/MM3 (ref 0.7–3.1)
LYMPHOCYTES NFR BLD AUTO: 20.4 % (ref 19.6–45.3)
MCH RBC QN AUTO: 30.4 PG (ref 26.6–33)
MCHC RBC AUTO-ENTMCNC: 31.1 G/DL (ref 31.5–35.7)
MCV RBC AUTO: 97.8 FL (ref 79–97)
MONOCYTES # BLD AUTO: 0.65 10*3/MM3 (ref 0.1–0.9)
MONOCYTES NFR BLD AUTO: 6.4 % (ref 5–12)
NEUTROPHILS # BLD AUTO: 7.04 10*3/MM3 (ref 1.7–7)
NEUTROPHILS NFR BLD AUTO: 69.1 % (ref 42.7–76)
NRBC BLD AUTO-RTO: 0 /100 WBC (ref 0–0.2)
PLATELET # BLD AUTO: 368 10*3/MM3 (ref 140–450)
POTASSIUM SERPL-SCNC: 4.9 MMOL/L (ref 3.5–5.2)
PROT SERPL-MCNC: 6.8 G/DL (ref 6–8.5)
RBC # BLD AUTO: 4.54 10*6/MM3 (ref 3.77–5.28)
SODIUM SERPL-SCNC: 143 MMOL/L (ref 136–145)
T3FREE SERPL-MCNC: 3.4 PG/ML (ref 2–4.4)
T4 FREE SERPL-MCNC: 1.27 NG/DL (ref 0.93–1.7)
TRIGL SERPL-MCNC: 311 MG/DL (ref 0–150)
TSH SERPL DL<=0.005 MIU/L-ACNC: 0.62 MIU/ML (ref 0.27–4.2)
VIT B12 SERPL-MCNC: 552 PG/ML (ref 211–946)
VLDLC SERPL CALC-MCNC: 62.2 MG/DL
WBC # BLD AUTO: 10.18 10*3/MM3 (ref 3.4–10.8)

## 2019-05-16 ENCOUNTER — OFFICE VISIT (OUTPATIENT)
Dept: FAMILY MEDICINE CLINIC | Facility: CLINIC | Age: 64
End: 2019-05-16

## 2019-05-16 VITALS
DIASTOLIC BLOOD PRESSURE: 89 MMHG | BODY MASS INDEX: 25.66 KG/M2 | WEIGHT: 154 LBS | TEMPERATURE: 98.2 F | HEART RATE: 92 BPM | SYSTOLIC BLOOD PRESSURE: 167 MMHG | HEIGHT: 65 IN | RESPIRATION RATE: 16 BRPM

## 2019-05-16 DIAGNOSIS — I10 BENIGN ESSENTIAL HYPERTENSION: ICD-10-CM

## 2019-05-16 DIAGNOSIS — J01.90 ACUTE SINUSITIS, RECURRENCE NOT SPECIFIED, UNSPECIFIED LOCATION: ICD-10-CM

## 2019-05-16 DIAGNOSIS — E11.9 TYPE 2 DIABETES MELLITUS WITHOUT COMPLICATION, WITHOUT LONG-TERM CURRENT USE OF INSULIN (HCC): Primary | ICD-10-CM

## 2019-05-16 PROBLEM — R73.01 IFG (IMPAIRED FASTING GLUCOSE): Status: RESOLVED | Noted: 2018-02-15 | Resolved: 2019-05-16

## 2019-05-16 PROCEDURE — 99214 OFFICE O/P EST MOD 30 MIN: CPT | Performed by: FAMILY MEDICINE

## 2019-05-16 RX ORDER — AMOXICILLIN 500 MG/1
500 CAPSULE ORAL 3 TIMES DAILY
Qty: 30 CAPSULE | Refills: 0 | Status: SHIPPED | OUTPATIENT
Start: 2019-05-16 | End: 2019-08-28

## 2019-05-16 RX ORDER — LISINOPRIL AND HYDROCHLOROTHIAZIDE 20; 12.5 MG/1; MG/1
1 TABLET ORAL DAILY
Qty: 30 TABLET | Refills: 3 | OUTPATIENT
Start: 2019-05-16 | End: 2019-08-29

## 2019-05-16 RX ORDER — METFORMIN HYDROCHLORIDE 500 MG/1
500 TABLET, EXTENDED RELEASE ORAL
Qty: 30 TABLET | Refills: 3 | Status: SHIPPED | OUTPATIENT
Start: 2019-05-16 | End: 2019-09-03 | Stop reason: SDUPTHER

## 2019-05-16 RX ORDER — AMLODIPINE BESYLATE 5 MG/1
5 TABLET ORAL DAILY
Qty: 30 TABLET | Refills: 3 | Status: SHIPPED | OUTPATIENT
Start: 2019-05-16 | End: 2019-09-03

## 2019-05-16 RX ORDER — LISINOPRIL 20 MG/1
20 TABLET ORAL DAILY
Qty: 30 TABLET | Refills: 3 | Status: CANCELLED | OUTPATIENT
Start: 2019-05-16

## 2019-05-16 NOTE — PROGRESS NOTES
Subjective   Elif Cardona is a 63 y.o. female.     History of Present Illness     Chief Complaint:   Chief Complaint   Patient presents with   • Hypertension     NEW BP MED SEEN ON 5/10/2019 - RATNA ARCINIEGA - LAB RESULTS       Elif Cardona 63 y.o. female who presents today for Medical Management of the below listed issues and medication refills.  she has a problem list of   Patient Active Problem List   Diagnosis   • Allergic rhinitis   • Fibromyalgia   • Generalized anxiety disorder   • Hemorrhoids   • Benign essential hypertension   • Irritable bowel syndrome (IBS)   • Major depressive disorder, recurrent episode, in full remission (CMS/HCC)   • Osteoarthritis, multiple sites   • Post-menopausal   • Rash and nonspecific skin eruption   • Sebaceous cyst   • Schizoaffective disorder, bipolar type (CMS/HCC)   • Hyperglycemia   • Tobacco abuse   • HTN (hypertension)   • Peripheral neuropathic pain   • Solitary pulmonary nodule on lung CT   • Pancreatic divisum   • Acute sinusitis   • Type 2 diabetes mellitus without complication, without long-term current use of insulin (CMS/HCC)   .  Since the last visit, she has had some recent labs ordered by Ratna Salazar and was found to have new onset DM.   she has been compliant with   Current Outpatient Medications:   •  amLODIPine (NORVASC) 5 MG tablet, Take 1 tablet by mouth Daily., Disp: 30 tablet, Rfl: 3  •  amoxicillin (AMOXIL) 500 MG capsule, Take 1 capsule by mouth 3 (Three) Times a Day., Disp: 30 capsule, Rfl: 0  •  cetirizine (ZyrTEC) 10 MG chewable tablet, Chew 10 mg daily., Disp: , Rfl:   •  hydrocortisone 2.5 % lotion, Apply  topically., Disp: , Rfl:   •  hyoscyamine (LEVBID) 0.375 MG 12 hr tablet, Take 1 tablet by mouth Every 12 (Twelve) Hours As Needed for Cramping. PRN Abd cramping and IBS, Disp: 60 tablet, Rfl: 5  •  lidocaine-hydrocortisone 3-0.5 % cream rectal cream, Change order to this dose and still BID prn, Disp: 85 g, Rfl: 2  •  lisinopril-hydrochlorothiazide  "(PRINZIDE,ZESTORETIC) 20-12.5 MG per tablet, Take 1 tablet by mouth Daily., Disp: 30 tablet, Rfl: 3  •  Loratadine (CLARITIN) 10 MG capsule, Take  by mouth., Disp: , Rfl:   •  metFORMIN ER (GLUCOPHAGE-XR) 500 MG 24 hr tablet, Take 1 tablet by mouth Daily With Breakfast., Disp: 30 tablet, Rfl: 3.  she denies medication side effects.    Ancillary, she never completed the Omnicef Ratna gave as was too expensive and never got it.    All of the chronic condition(s) listed above are stable w/o issues.    /89   Pulse 92   Temp 98.2 °F (36.8 °C) (Oral)   Resp 16   Ht 163.8 cm (64.5\")   Wt 69.9 kg (154 lb)   LMP  (LMP Unknown)   BMI 26.03 kg/m²     Results for orders placed or performed in visit on 05/10/19   Comprehensive metabolic panel   Result Value Ref Range    Glucose 157 (H) 65 - 99 mg/dL    BUN 13 8 - 23 mg/dL    Creatinine 0.70 0.57 - 1.00 mg/dL    eGFR Non African Am 85 >60 mL/min/1.73    eGFR African Am 103 >60 mL/min/1.73    BUN/Creatinine Ratio 18.6 7.0 - 25.0    Sodium 143 136 - 145 mmol/L    Potassium 4.9 3.5 - 5.2 mmol/L    Chloride 100 98 - 107 mmol/L    Total CO2 29.8 (H) 22.0 - 29.0 mmol/L    Calcium 10.5 8.6 - 10.5 mg/dL    Total Protein 6.8 6.0 - 8.5 g/dL    Albumin 4.40 3.50 - 5.20 g/dL    Globulin 2.4 gm/dL    A/G Ratio 1.8 g/dL    Total Bilirubin <0.2 (L) 0.2 - 1.2 mg/dL    Alkaline Phosphatase 84 39 - 117 U/L    AST (SGOT) 7 1 - 32 U/L    ALT (SGPT) 43 (H) 1 - 33 U/L   Lipid panel   Result Value Ref Range    Total Cholesterol 191 0 - 200 mg/dL    Triglycerides 311 (H) 0 - 150 mg/dL    HDL Cholesterol 43 40 - 60 mg/dL    VLDL Cholesterol 62.2 mg/dL    LDL Cholesterol  86 0 - 100 mg/dL   TSH   Result Value Ref Range    TSH 0.619 0.270 - 4.200 mIU/mL   Hemoglobin A1c   Result Value Ref Range    Hemoglobin A1C 7.70 (H) 4.80 - 5.60 %   T4, Free   Result Value Ref Range    Free T4 1.27 0.93 - 1.70 ng/dL   T3, Free   Result Value Ref Range    T3, Free 3.4 2.0 - 4.4 pg/mL   Vitamin B12   Result " Value Ref Range    Vitamin B-12 552 211 - 946 pg/mL   Folate   Result Value Ref Range    Folate 9.33 4.78 - 24.20 ng/mL   Vitamin D 25 Hydroxy   Result Value Ref Range    25 Hydroxy, Vitamin D 21.4 (L) 30.0 - 100.0 ng/ml   CBC and Differential   Result Value Ref Range    WBC 10.18 3.40 - 10.80 10*3/mm3    RBC 4.54 3.77 - 5.28 10*6/mm3    Hemoglobin 13.8 12.0 - 15.9 g/dL    Hematocrit 44.4 34.0 - 46.6 %    MCV 97.8 (H) 79.0 - 97.0 fL    MCH 30.4 26.6 - 33.0 pg    MCHC 31.1 (L) 31.5 - 35.7 g/dL    RDW 11.9 (L) 12.3 - 15.4 %    Platelets 368 140 - 450 10*3/mm3    Neutrophil Rel % 69.1 42.7 - 76.0 %    Lymphocyte Rel % 20.4 19.6 - 45.3 %    Monocyte Rel % 6.4 5.0 - 12.0 %    Eosinophil Rel % 2.2 0.3 - 6.2 %    Basophil Rel % 1.5 0.0 - 1.5 %    Neutrophils Absolute 7.04 (H) 1.70 - 7.00 10*3/mm3    Lymphocytes Absolute 2.08 0.70 - 3.10 10*3/mm3    Monocytes Absolute 0.65 0.10 - 0.90 10*3/mm3    Eosinophils Absolute 0.22 0.00 - 0.40 10*3/mm3    Basophils Absolute 0.15 0.00 - 0.20 10*3/mm3    Immature Granulocyte Rel % 0.4 0.0 - 0.5 %    Immature Grans Absolute 0.04 0.00 - 0.05 10*3/mm3    nRBC 0.0 0.0 - 0.2 /100 WBC           The following portions of the patient's history were reviewed and updated as appropriate: allergies, current medications, past family history, past medical history, past social history, past surgical history and problem list.    Review of Systems   Constitutional: Negative for activity change, chills, fatigue and fever.   Respiratory: Negative for cough and chest tightness.    Cardiovascular: Negative for chest pain and palpitations.   Gastrointestinal: Negative for abdominal pain and nausea.   Endocrine: Negative for cold intolerance.   Psychiatric/Behavioral: Negative for behavioral problems and dysphoric mood.       Objective   Physical Exam   Constitutional: She appears well-developed and well-nourished.   Neck: Neck supple. No thyromegaly present.   Cardiovascular: Normal rate and regular rhythm.    No murmur heard.  Pulmonary/Chest: Effort normal and breath sounds normal.   Abdominal: Bowel sounds are normal. There is no tenderness.   Neurological: She is alert.   Psychiatric: She has a normal mood and affect. Her behavior is normal.   Nursing note and vitals reviewed.      Assessment/Plan   Elif was seen today for hypertension.    Diagnoses and all orders for this visit:    Type 2 diabetes mellitus without complication, without long-term current use of insulin (CMS/[facility])  Comments:  new  Orders:  -     Hemoglobin A1c; Future  -     MicroAlbumin, Urine, Random - Urine, Clean Catch; Future  -     Basic Metabolic Panel; Future  -     Ambulatory Referral to Nutrition Services  -     metFORMIN ER (GLUCOPHAGE-XR) 500 MG 24 hr tablet; Take 1 tablet by mouth Daily With Breakfast.    Benign essential hypertension  Comments:  uncontrolled  Orders:  -     amLODIPine (NORVASC) 5 MG tablet; Take 1 tablet by mouth Daily.  -     lisinopril-hydrochlorothiazide (PRINZIDE,ZESTORETIC) 20-12.5 MG per tablet; Take 1 tablet by mouth Daily.    Acute sinusitis, recurrence not specified, unspecified location  -     amoxicillin (AMOXIL) 500 MG capsule; Take 1 capsule by mouth 3 (Three) Times a Day.    Other orders  -     Cancel: lisinopril (PRINIVIL,ZESTRIL) 20 MG tablet; Take 1 tablet by mouth Daily.

## 2019-07-16 ENCOUNTER — RESULTS ENCOUNTER (OUTPATIENT)
Dept: FAMILY MEDICINE CLINIC | Facility: CLINIC | Age: 64
End: 2019-07-16

## 2019-07-16 DIAGNOSIS — E11.9 TYPE 2 DIABETES MELLITUS WITHOUT COMPLICATION, WITHOUT LONG-TERM CURRENT USE OF INSULIN (HCC): ICD-10-CM

## 2019-08-28 ENCOUNTER — OFFICE VISIT (OUTPATIENT)
Dept: RETAIL CLINIC | Facility: CLINIC | Age: 64
End: 2019-08-28

## 2019-08-28 VITALS
DIASTOLIC BLOOD PRESSURE: 87 MMHG | TEMPERATURE: 98.6 F | SYSTOLIC BLOOD PRESSURE: 144 MMHG | HEART RATE: 101 BPM | OXYGEN SATURATION: 91 %

## 2019-08-28 DIAGNOSIS — J40 BRONCHITIS: Primary | ICD-10-CM

## 2019-08-28 PROCEDURE — 99213 OFFICE O/P EST LOW 20 MIN: CPT | Performed by: NURSE PRACTITIONER

## 2019-08-28 RX ORDER — METHYLPREDNISOLONE 4 MG/1
TABLET ORAL
Qty: 21 TABLET | Refills: 0 | Status: SHIPPED | OUTPATIENT
Start: 2019-08-28 | End: 2019-09-03

## 2019-08-28 RX ORDER — BENZONATATE 100 MG/1
CAPSULE ORAL
Qty: 30 CAPSULE | Refills: 0 | Status: SHIPPED | OUTPATIENT
Start: 2019-08-28 | End: 2019-09-03

## 2019-08-28 RX ORDER — AZITHROMYCIN 250 MG/1
TABLET, FILM COATED ORAL
Qty: 6 TABLET | Refills: 0 | Status: SHIPPED | OUTPATIENT
Start: 2019-08-28 | End: 2019-09-03

## 2019-08-28 NOTE — PROGRESS NOTES
Subjective:     Elif Cardona is a 63 y.o.     Cough   Episode onset: started 4 days ago. The cough is productive of purulent sputum. Associated symptoms include chills, a fever, postnasal drip and wheezing (little). Pertinent negatives include no nasal congestion or shortness of breath. Ear pain: left. Sore throat: intermittent. Treatments tried: mucinex, ori seltzer cold and flu. The treatment provided mild relief.         The following portions of the patient's history were reviewed and updated as appropriate: allergies, current medications, past family history, past medical history, past social history, past surgical history and problem list.      Review of Systems   Constitutional: Positive for chills and fever.   HENT: Positive for postnasal drip. Negative for congestion. Ear pain: left. Sore throat: intermittent.    Respiratory: Positive for cough and wheezing (little). Negative for shortness of breath.    Cardiovascular:        Hx:hypertension         Objective:      Physical Exam   HENT:   Head: Normocephalic and atraumatic.   Right Ear: Tympanic membrane and ear canal normal.   Left Ear: Tympanic membrane and ear canal normal.   Nose: Nose normal.   Mouth/Throat: No oropharyngeal exudate or posterior oropharyngeal erythema.   Post nasal drainage noted   Cardiovascular: Normal rate, regular rhythm, S1 normal, S2 normal and normal heart sounds.   Pulmonary/Chest: Effort normal. She has decreased breath sounds. She has rhonchi in the right upper field and the left upper field.   Lymphadenopathy:     She has no cervical adenopathy.   Vitals reviewed.          Diagnoses and all orders for this visit:    Bronchitis    Other orders  -     methylPREDNISolone (MEDROL, DAMIAN,) 4 MG tablet; Take as directed on package instructions.  -     azithromycin (ZITHROMAX) 250 MG tablet; Take 2 tablets the first day, then 1 tablet daily for 4 days.  -     benzonatate (TESSALON PERLES) 100 MG capsule; 1 to 2 capsules 3 times a  day

## 2019-08-29 ENCOUNTER — HOSPITAL ENCOUNTER (EMERGENCY)
Facility: HOSPITAL | Age: 64
Discharge: HOME | End: 2019-08-29
Attending: EMERGENCY MEDICINE | Admitting: EMERGENCY MEDICINE

## 2019-08-29 VITALS
WEIGHT: 144.4 LBS | HEART RATE: 96 BPM | BODY MASS INDEX: 24.65 KG/M2 | RESPIRATION RATE: 16 BRPM | OXYGEN SATURATION: 90 % | HEIGHT: 64 IN | DIASTOLIC BLOOD PRESSURE: 89 MMHG | SYSTOLIC BLOOD PRESSURE: 151 MMHG | TEMPERATURE: 99.6 F

## 2019-08-29 DIAGNOSIS — T78.3XXA ANGIOEDEMA DUE TO ANGIOTENSIN CONVERTING ENZYME INHIBITOR (ACE-I): Primary | ICD-10-CM

## 2019-08-29 DIAGNOSIS — T46.4X5A ANGIOEDEMA DUE TO ANGIOTENSIN CONVERTING ENZYME INHIBITOR (ACE-I): Primary | ICD-10-CM

## 2019-08-29 LAB
ABO GROUP BLD: NORMAL
ALBUMIN SERPL-MCNC: 4.9 G/DL (ref 3.5–5.2)
ALBUMIN/GLOB SERPL: 1.6 G/DL
ALP SERPL-CCNC: 105 U/L (ref 39–117)
ALT SERPL W P-5'-P-CCNC: 22 U/L (ref 1–33)
ANION GAP SERPL CALCULATED.3IONS-SCNC: 14.6 MMOL/L (ref 5–15)
AST SERPL-CCNC: 14 U/L (ref 1–32)
BASOPHILS # BLD AUTO: 0.03 10*3/MM3 (ref 0–0.2)
BASOPHILS NFR BLD AUTO: 0.2 % (ref 0–1.5)
BILIRUB SERPL-MCNC: 0.5 MG/DL (ref 0.2–1.2)
BLD GP AB SCN SERPL QL: NEGATIVE
BUN BLD-MCNC: 15 MG/DL (ref 8–23)
BUN/CREAT SERPL: 23.1 (ref 7–25)
CALCIUM SPEC-SCNC: 9.9 MG/DL (ref 8.6–10.5)
CHLORIDE SERPL-SCNC: 92 MMOL/L (ref 98–107)
CO2 SERPL-SCNC: 27.4 MMOL/L (ref 22–29)
CREAT BLD-MCNC: 0.65 MG/DL (ref 0.57–1)
DEPRECATED RDW RBC AUTO: 42.2 FL (ref 37–54)
EOSINOPHIL # BLD AUTO: 0 10*3/MM3 (ref 0–0.4)
EOSINOPHIL NFR BLD AUTO: 0 % (ref 0.3–6.2)
ERYTHROCYTE [DISTWIDTH] IN BLOOD BY AUTOMATED COUNT: 12.1 % (ref 12.3–15.4)
GFR SERPL CREATININE-BSD FRML MDRD: 93 ML/MIN/1.73
GLOBULIN UR ELPH-MCNC: 3 GM/DL
GLUCOSE BLD-MCNC: 338 MG/DL (ref 65–99)
HCT VFR BLD AUTO: 46.1 % (ref 34–46.6)
HGB BLD-MCNC: 14.6 G/DL (ref 12–15.9)
IMM GRANULOCYTES # BLD AUTO: 0.12 10*3/MM3 (ref 0–0.05)
IMM GRANULOCYTES NFR BLD AUTO: 0.7 % (ref 0–0.5)
LYMPHOCYTES # BLD AUTO: 1.43 10*3/MM3 (ref 0.7–3.1)
LYMPHOCYTES NFR BLD AUTO: 8.5 % (ref 19.6–45.3)
MCH RBC QN AUTO: 30.1 PG (ref 26.6–33)
MCHC RBC AUTO-ENTMCNC: 31.7 G/DL (ref 31.5–35.7)
MCV RBC AUTO: 95.1 FL (ref 79–97)
MONOCYTES # BLD AUTO: 0.67 10*3/MM3 (ref 0.1–0.9)
MONOCYTES NFR BLD AUTO: 4 % (ref 5–12)
NEUTROPHILS # BLD AUTO: 14.49 10*3/MM3 (ref 1.7–7)
NEUTROPHILS NFR BLD AUTO: 86.6 % (ref 42.7–76)
NRBC BLD AUTO-RTO: 0 /100 WBC (ref 0–0.2)
PLATELET # BLD AUTO: 415 10*3/MM3 (ref 140–450)
PMV BLD AUTO: 10.4 FL (ref 6–12)
POTASSIUM BLD-SCNC: 4.6 MMOL/L (ref 3.5–5.2)
PROT SERPL-MCNC: 7.9 G/DL (ref 6–8.5)
RBC # BLD AUTO: 4.85 10*6/MM3 (ref 3.77–5.28)
RH BLD: POSITIVE
SODIUM BLD-SCNC: 134 MMOL/L (ref 136–145)
T&S EXPIRATION DATE: NORMAL
WBC NRBC COR # BLD: 16.74 10*3/MM3 (ref 3.4–10.8)

## 2019-08-29 PROCEDURE — 80053 COMPREHEN METABOLIC PANEL: CPT | Performed by: EMERGENCY MEDICINE

## 2019-08-29 PROCEDURE — 96375 TX/PRO/DX INJ NEW DRUG ADDON: CPT

## 2019-08-29 PROCEDURE — 86900 BLOOD TYPING SEROLOGIC ABO: CPT | Performed by: EMERGENCY MEDICINE

## 2019-08-29 PROCEDURE — 99284 EMERGENCY DEPT VISIT MOD MDM: CPT

## 2019-08-29 PROCEDURE — 86927 PLASMA FRESH FROZEN: CPT

## 2019-08-29 PROCEDURE — P9017 PLASMA 1 DONOR FRZ W/IN 8 HR: HCPCS

## 2019-08-29 PROCEDURE — 85025 COMPLETE CBC W/AUTO DIFF WBC: CPT | Performed by: EMERGENCY MEDICINE

## 2019-08-29 PROCEDURE — 96374 THER/PROPH/DIAG INJ IV PUSH: CPT

## 2019-08-29 PROCEDURE — 25010000002 DIPHENHYDRAMINE PER 50 MG: Performed by: EMERGENCY MEDICINE

## 2019-08-29 PROCEDURE — 86850 RBC ANTIBODY SCREEN: CPT | Performed by: EMERGENCY MEDICINE

## 2019-08-29 PROCEDURE — 36415 COLL VENOUS BLD VENIPUNCTURE: CPT

## 2019-08-29 PROCEDURE — 36430 TRANSFUSION BLD/BLD COMPNT: CPT

## 2019-08-29 PROCEDURE — 25010000002 METHYLPREDNISOLONE PER 125 MG: Performed by: EMERGENCY MEDICINE

## 2019-08-29 PROCEDURE — 86901 BLOOD TYPING SEROLOGIC RH(D): CPT | Performed by: EMERGENCY MEDICINE

## 2019-08-29 RX ORDER — HYDROCHLOROTHIAZIDE 25 MG/1
25 TABLET ORAL DAILY
Qty: 30 TABLET | Refills: 0 | Status: SHIPPED | OUTPATIENT
Start: 2019-08-29 | End: 2019-09-03 | Stop reason: SDUPTHER

## 2019-08-29 RX ORDER — SODIUM CHLORIDE 0.9 % (FLUSH) 0.9 %
10 SYRINGE (ML) INJECTION AS NEEDED
Status: DISCONTINUED | OUTPATIENT
Start: 2019-08-29 | End: 2019-08-29 | Stop reason: HOSPADM

## 2019-08-29 RX ORDER — DIPHENHYDRAMINE HYDROCHLORIDE 50 MG/ML
50 INJECTION INTRAMUSCULAR; INTRAVENOUS ONCE
Status: COMPLETED | OUTPATIENT
Start: 2019-08-29 | End: 2019-08-29

## 2019-08-29 RX ORDER — METHYLPREDNISOLONE SODIUM SUCCINATE 125 MG/2ML
125 INJECTION, POWDER, LYOPHILIZED, FOR SOLUTION INTRAMUSCULAR; INTRAVENOUS ONCE
Status: COMPLETED | OUTPATIENT
Start: 2019-08-29 | End: 2019-08-29

## 2019-08-29 RX ADMIN — DIPHENHYDRAMINE HYDROCHLORIDE 50 MG: 50 INJECTION, SOLUTION INTRAMUSCULAR; INTRAVENOUS at 07:14

## 2019-08-29 RX ADMIN — METHYLPREDNISOLONE SODIUM SUCCINATE 125 MG: 125 INJECTION, POWDER, FOR SOLUTION INTRAMUSCULAR; INTRAVENOUS at 07:13

## 2019-08-30 LAB
ABO + RH BLD: NORMAL
ABO + RH BLD: NORMAL
BH BB BLOOD EXPIRATION DATE: NORMAL
BH BB BLOOD EXPIRATION DATE: NORMAL
BH BB BLOOD TYPE BARCODE: 6200
BH BB BLOOD TYPE BARCODE: 6200
BH BB DISPENSE STATUS: NORMAL
BH BB DISPENSE STATUS: NORMAL
BH BB PRODUCT CODE: NORMAL
BH BB PRODUCT CODE: NORMAL
BH BB UNIT NUMBER: NORMAL
BH BB UNIT NUMBER: NORMAL
UNIT  ABO: NORMAL
UNIT  ABO: NORMAL
UNIT  RH: NORMAL
UNIT  RH: NORMAL

## 2019-09-03 ENCOUNTER — OFFICE VISIT (OUTPATIENT)
Dept: FAMILY MEDICINE CLINIC | Facility: CLINIC | Age: 64
End: 2019-09-03

## 2019-09-03 VITALS
TEMPERATURE: 98.3 F | HEART RATE: 82 BPM | WEIGHT: 144 LBS | HEIGHT: 64 IN | DIASTOLIC BLOOD PRESSURE: 88 MMHG | OXYGEN SATURATION: 95 % | RESPIRATION RATE: 16 BRPM | SYSTOLIC BLOOD PRESSURE: 144 MMHG | BODY MASS INDEX: 24.59 KG/M2

## 2019-09-03 DIAGNOSIS — Z09 HOSPITAL DISCHARGE FOLLOW-UP: ICD-10-CM

## 2019-09-03 DIAGNOSIS — R10.9 ABDOMINAL CRAMPING: ICD-10-CM

## 2019-09-03 DIAGNOSIS — T78.3XXD ANGIOEDEMA, SUBSEQUENT ENCOUNTER: ICD-10-CM

## 2019-09-03 DIAGNOSIS — E11.9 TYPE 2 DIABETES MELLITUS WITHOUT COMPLICATION, WITHOUT LONG-TERM CURRENT USE OF INSULIN (HCC): Primary | ICD-10-CM

## 2019-09-03 DIAGNOSIS — I10 BENIGN ESSENTIAL HYPERTENSION: ICD-10-CM

## 2019-09-03 PROCEDURE — 99214 OFFICE O/P EST MOD 30 MIN: CPT | Performed by: FAMILY MEDICINE

## 2019-09-03 RX ORDER — AMLODIPINE BESYLATE 10 MG/1
10 TABLET ORAL DAILY
Qty: 30 TABLET | Refills: 3 | OUTPATIENT
Start: 2019-09-03 | End: 2022-01-01

## 2019-09-03 RX ORDER — AMLODIPINE BESYLATE 5 MG/1
5 TABLET ORAL DAILY
Qty: 30 TABLET | Refills: 3 | Status: CANCELLED | OUTPATIENT
Start: 2019-09-03

## 2019-09-03 RX ORDER — HYOSCYAMINE SULFATE EXTENDED-RELEASE 0.38 MG/1
0.38 TABLET ORAL EVERY 12 HOURS PRN
Qty: 60 TABLET | Refills: 5 | OUTPATIENT
Start: 2019-09-03 | End: 2022-01-01

## 2019-09-03 RX ORDER — METFORMIN HYDROCHLORIDE 500 MG/1
500 TABLET, EXTENDED RELEASE ORAL
Qty: 30 TABLET | Refills: 3 | Status: SHIPPED | OUTPATIENT
Start: 2019-09-03 | End: 2019-09-05 | Stop reason: SDUPTHER

## 2019-09-03 RX ORDER — HYDROCHLOROTHIAZIDE 25 MG/1
25 TABLET ORAL DAILY
Qty: 30 TABLET | Refills: 3 | OUTPATIENT
Start: 2019-09-03 | End: 2022-01-01

## 2019-09-03 NOTE — PROGRESS NOTES
Subjective   Elif Cardona is a 63 y.o. female.     History of Present Illness     Chief Complaint:   Chief Complaint   Patient presents with   • Diabetes   • Hypertension   • angioedema     hosp follow up 8/29/2019 - CHANGES IN MEDS PER PT        Elif Cardona 63 y.o. female who presents today for Medical Management of the below listed issues and medication refills.  she has a problem list of   Patient Active Problem List   Diagnosis   • Allergic rhinitis   • Fibromyalgia   • Generalized anxiety disorder   • Hemorrhoids   • Benign essential hypertension   • Irritable bowel syndrome (IBS)   • Major depressive disorder, recurrent episode, in full remission (CMS/HCC)   • Osteoarthritis, multiple sites   • Post-menopausal   • Rash and nonspecific skin eruption   • Sebaceous cyst   • Schizoaffective disorder, bipolar type (CMS/HCC)   • Hyperglycemia   • Tobacco abuse   • HTN (hypertension)   • Peripheral neuropathic pain   • Solitary pulmonary nodule on lung CT   • Pancreatic divisum   • Acute sinusitis   • Type 2 diabetes mellitus without complication, without long-term current use of insulin (CMS/HCC)   .  Since the last visit, she has overall felt well until a trip t the ED on 8/29/19. That visit was as follows:    HPI:  Chief Complaint: tongue swelling   A complete HPI/ROS/PMH/PSH/SH/FH are unobtainable due to: none     Context: Elif Cardona is a 63 y.o. female who presents to the ED c/o tongue and submandibular swelling that started earlier this morning at 0100 and has progressively worsened since then. Pt states that her swelling is somewhat painful and that she is having trouble swallowing her own saliva. Pt denies rash, itching, and SOA. Pt was seen at urgent care yesterday for URI symptoms and was started on azithromycin and prednisone. Pt takes lisinopril.     DIAGNOSIS  Final diagnoses:   Angioedema due to angiotensin converting enzyme inhibitor (ACE-I)       Medication List      New Prescriptions   "  hydrochlorothiazide 25 MG tablet  Commonly known as:  HYDRODIURIL  Take 1 tablet by mouth Daily.        Stop    lisinopril-hydrochlorothiazide 20-12.5 MG per tablet  Commonly known as:  PRINZIDE,ZESTORETIC    Current outpatient and discharge medications have been reconciled for the patient.  Reviewed by: Quinton Sin MD      she has been compliant with   Current Outpatient Medications:   •  amLODIPine (NORVASC) 10 MG tablet, Take 1 tablet by mouth Daily., Disp: 30 tablet, Rfl: 3  •  hyoscyamine (LEVBID) 0.375 MG 12 hr tablet, Take 1 tablet by mouth Every 12 (Twelve) Hours As Needed for Cramping. PRN Abd cramping and IBS, Disp: 60 tablet, Rfl: 5  •  metFORMIN ER (GLUCOPHAGE-XR) 500 MG 24 hr tablet, Take 1 tablet by mouth Daily With Breakfast., Disp: 30 tablet, Rfl: 3  •  cetirizine (ZyrTEC) 10 MG chewable tablet, Chew 10 mg daily., Disp: , Rfl:   •  hydrochlorothiazide (HYDRODIURIL) 25 MG tablet, Take 1 tablet by mouth Daily., Disp: 30 tablet, Rfl: 3  •  hydrocortisone 2.5 % lotion, Apply  topically., Disp: , Rfl:   •  lidocaine-hydrocortisone 3-0.5 % cream rectal cream, Change order to this dose and still BID prn, Disp: 85 g, Rfl: 2  •  Loratadine (CLARITIN) 10 MG capsule, Take  by mouth., Disp: , Rfl: .  she denies medication side effects.    All of the chronic condition(s) listed above are stable w/o issues.    /88   Pulse 82   Temp 98.3 °F (36.8 °C) (Oral)   Resp 16   Ht 162.6 cm (64\")   Wt 65.3 kg (144 lb)   LMP  (LMP Unknown)   SpO2 95%   BMI 24.72 kg/m²     Results for orders placed or performed during the hospital encounter of 08/29/19   Comprehensive Metabolic Panel   Result Value Ref Range    Glucose 338 (H) 65 - 99 mg/dL    BUN 15 8 - 23 mg/dL    Creatinine 0.65 0.57 - 1.00 mg/dL    Sodium 134 (L) 136 - 145 mmol/L    Potassium 4.6 3.5 - 5.2 mmol/L    Chloride 92 (L) 98 - 107 mmol/L    CO2 27.4 22.0 - 29.0 mmol/L    Calcium 9.9 8.6 - 10.5 mg/dL    Total Protein 7.9 6.0 - 8.5 g/dL    " Albumin 4.90 3.50 - 5.20 g/dL    ALT (SGPT) 22 1 - 33 U/L    AST (SGOT) 14 1 - 32 U/L    Alkaline Phosphatase 105 39 - 117 U/L    Total Bilirubin 0.5 0.2 - 1.2 mg/dL    eGFR Non African Amer 93 >60 mL/min/1.73    Globulin 3.0 gm/dL    A/G Ratio 1.6 g/dL    BUN/Creatinine Ratio 23.1 7.0 - 25.0    Anion Gap 14.6 5.0 - 15.0 mmol/L   CBC Auto Differential   Result Value Ref Range    WBC 16.74 (H) 3.40 - 10.80 10*3/mm3    RBC 4.85 3.77 - 5.28 10*6/mm3    Hemoglobin 14.6 12.0 - 15.9 g/dL    Hematocrit 46.1 34.0 - 46.6 %    MCV 95.1 79.0 - 97.0 fL    MCH 30.1 26.6 - 33.0 pg    MCHC 31.7 31.5 - 35.7 g/dL    RDW 12.1 (L) 12.3 - 15.4 %    RDW-SD 42.2 37.0 - 54.0 fl    MPV 10.4 6.0 - 12.0 fL    Platelets 415 140 - 450 10*3/mm3    Neutrophil % 86.6 (H) 42.7 - 76.0 %    Lymphocyte % 8.5 (L) 19.6 - 45.3 %    Monocyte % 4.0 (L) 5.0 - 12.0 %    Eosinophil % 0.0 (L) 0.3 - 6.2 %    Basophil % 0.2 0.0 - 1.5 %    Immature Grans % 0.7 (H) 0.0 - 0.5 %    Neutrophils, Absolute 14.49 (H) 1.70 - 7.00 10*3/mm3    Lymphocytes, Absolute 1.43 0.70 - 3.10 10*3/mm3    Monocytes, Absolute 0.67 0.10 - 0.90 10*3/mm3    Eosinophils, Absolute 0.00 0.00 - 0.40 10*3/mm3    Basophils, Absolute 0.03 0.00 - 0.20 10*3/mm3    Immature Grans, Absolute 0.12 (H) 0.00 - 0.05 10*3/mm3    nRBC 0.0 0.0 - 0.2 /100 WBC   Prepare Fresh Frozen Plasma, 2 Units   Result Value Ref Range    Product Code O8078HG9     Unit Number [id number]     UNIT  ABO A     UNIT  RH POS     Dispense Status PT     Blood Type APOS     Blood Expiration Date 201909032359     Blood Type Barcode 6200     Product Code N5764Z68     Unit Number [id number]     UNIT  ABO A     UNIT  RH POS     Dispense Status PT     Blood Type APOS     Blood Expiration Date [id number]     Blood Type Barcode 6200    Type & Screen   Result Value Ref Range    ABO Type A     RH type Positive     Antibody Screen Negative     T&S Expiration Date 9/1/2019 11:59:59 PM            The following portions of the  patient's history were reviewed and updated as appropriate: allergies, current medications, past family history, past medical history, past social history, past surgical history and problem list.    Review of Systems   Constitutional: Negative for activity change, chills, fatigue and fever.   Respiratory: Negative for cough and chest tightness.    Cardiovascular: Negative for chest pain and palpitations.   Gastrointestinal: Negative for abdominal pain and nausea.   Endocrine: Negative for cold intolerance.   Psychiatric/Behavioral: Negative for behavioral problems and dysphoric mood.       Objective   Physical Exam   Constitutional: She appears well-developed and well-nourished.   Neck: Neck supple. No thyromegaly present.   Cardiovascular: Normal rate and regular rhythm.   No murmur heard.  Pulmonary/Chest: Effort normal and breath sounds normal.   Abdominal: Bowel sounds are normal. There is no tenderness.    Elif had a diabetic foot exam performed today.   During the foot exam she had a monofilament test performed.    Neurological Sensory Findings - Unaltered hot/cold right ankle/foot discrimination and unaltered hot/cold left ankle/foot discrimination. Unaltered sharp/dull right ankle/foot discrimination and unaltered sharp/dull left ankle/foot discrimination. No right ankle/foot altered proprioception and no left ankle/foot altered proprioception  Vascular Status -  Her right foot exhibits normal foot vasculature  and no edema. Her left foot exhibits normal foot vasculature  and no edema.  Skin Integrity  -  Her right foot skin is intact.Her left foot skin is intact..  Neurological: She is alert.   Psychiatric: She has a normal mood and affect. Her behavior is normal.   Nursing note and vitals reviewed.  Hospital records reviewed with pt confirming HPI.  Labs ordered and pt to complete.    Assessment/Plan   Elif was seen today for diabetes, hypertension and angioedema.    Diagnoses and all orders for this  visit:    Type 2 diabetes mellitus without complication, without long-term current use of insulin (CMS/[facility])  -     metFORMIN ER (GLUCOPHAGE-XR) 500 MG 24 hr tablet; Take 1 tablet by mouth Daily With Breakfast.  -     Ambulatory Referral to Nutrition Services    Benign essential hypertension  Comments:  uncontrolled  Orders:  -     hydrochlorothiazide (HYDRODIURIL) 25 MG tablet; Take 1 tablet by mouth Daily.  -     amLODIPine (NORVASC) 10 MG tablet; Take 1 tablet by mouth Daily.    Abdominal cramping  -     hyoscyamine (LEVBID) 0.375 MG 12 hr tablet; Take 1 tablet by mouth Every 12 (Twelve) Hours As Needed for Cramping. PRN Abd cramping and IBS    Angioedema, subsequent encounter    Hospital discharge follow-up    Other orders  -     Cancel: amLODIPine (NORVASC) 5 MG tablet; Take 1 tablet by mouth Daily.

## 2019-09-04 LAB
BUN SERPL-MCNC: 13 MG/DL (ref 8–23)
BUN/CREAT SERPL: 21.3 (ref 7–25)
CALCIUM SERPL-MCNC: 9.7 MG/DL (ref 8.6–10.5)
CHLORIDE SERPL-SCNC: 98 MMOL/L (ref 98–107)
CO2 SERPL-SCNC: 29.6 MMOL/L (ref 22–29)
CREAT SERPL-MCNC: 0.61 MG/DL (ref 0.57–1)
GLUCOSE SERPL-MCNC: 149 MG/DL (ref 65–99)
HBA1C MFR BLD: 8.8 % (ref 4.8–5.6)
MICROALBUMIN UR-MCNC: 17.5 UG/ML
POTASSIUM SERPL-SCNC: 4.7 MMOL/L (ref 3.5–5.2)
SODIUM SERPL-SCNC: 142 MMOL/L (ref 136–145)

## 2019-09-05 DIAGNOSIS — E13.9 DIABETES 1.5, MANAGED AS TYPE 2 (HCC): Primary | ICD-10-CM

## 2019-09-05 DIAGNOSIS — E11.9 TYPE 2 DIABETES MELLITUS WITHOUT COMPLICATION, WITHOUT LONG-TERM CURRENT USE OF INSULIN (HCC): ICD-10-CM

## 2019-09-05 RX ORDER — METFORMIN HYDROCHLORIDE 500 MG/1
500 TABLET, EXTENDED RELEASE ORAL 4 TIMES DAILY
Qty: 120 TABLET | Refills: 3 | OUTPATIENT
Start: 2019-09-05 | End: 2022-01-01

## 2019-10-22 ENCOUNTER — OFFICE VISIT (OUTPATIENT)
Dept: RETAIL CLINIC | Facility: CLINIC | Age: 64
End: 2019-10-22

## 2019-10-22 VITALS
TEMPERATURE: 98.4 F | OXYGEN SATURATION: 94 % | SYSTOLIC BLOOD PRESSURE: 116 MMHG | HEART RATE: 82 BPM | DIASTOLIC BLOOD PRESSURE: 70 MMHG

## 2019-10-22 DIAGNOSIS — L02.92 BOIL: Primary | ICD-10-CM

## 2019-10-22 PROCEDURE — 99213 OFFICE O/P EST LOW 20 MIN: CPT | Performed by: NURSE PRACTITIONER

## 2019-10-22 RX ORDER — SULFAMETHOXAZOLE AND TRIMETHOPRIM 800; 160 MG/1; MG/1
1 TABLET ORAL 2 TIMES DAILY
Qty: 7 TABLET | Refills: 14 | Status: SHIPPED | OUTPATIENT
Start: 2019-10-22 | End: 2019-11-06

## 2019-10-22 NOTE — PROGRESS NOTES
Subjective:     Elif Cardona is a 64 y.o.     Patient presents with a boil on her left groin region. She has had these in the past. It is red and sore. She has tried putting boil EZ on it with some relief of symptoms. She has had it for one week this time. No fever.          The following portions of the patient's history were reviewed and updated as appropriate: allergies, current medications, past family history, past medical history, past social history, past surgical history and problem list.      Review of Systems   Constitutional: Negative for fatigue.   Respiratory: Negative.    Cardiovascular:        Hx;hypertension   Skin:        Boil left groin region, red, sore, has had them in the past         Objective:      Physical Exam   Constitutional: She appears well-developed and well-nourished.   Cardiovascular: Normal rate, regular rhythm, S1 normal, S2 normal and normal heart sounds.   Pulmonary/Chest: Effort normal and breath sounds normal.   Lymphadenopathy:     She has no cervical adenopathy.   Skin:   Left groin with half dollar sized indurated lesion with intact purulent central lesion, erythema surrounding the central lesion   Vitals reviewed.          Diagnoses and all orders for this visit:    Boil    Other orders  -     sulfamethoxazole-trimethoprim (BACTRIM DS,SEPTRA DS) 800-160 MG per tablet; Take 1 tablet by mouth 2 (Two) Times a Day.  -     mupirocin (BACTROBAN) 2 % ointment; Apply  topically to the appropriate area as directed 3 (Three) Times a Day.

## 2019-10-22 NOTE — PATIENT INSTRUCTIONS
Skin Abscess    A skin abscess is an infected area on or under your skin that contains a collection of pus and other material. An abscess may also be called a furuncle, carbuncle, or boil. An abscess can occur in or on almost any part of your body.  Some abscesses break open (rupture) on their own. Most continue to get worse unless they are treated. The infection can spread deeper into the body and eventually into your blood, which can make you feel ill. Treatment usually involves draining the abscess.  What are the causes?  An abscess occurs when germs, like bacteria, pass through your skin and cause an infection. This may be caused by:  · A scrape or cut on your skin.  · A puncture wound through your skin, including a needle injection or insect bite.  · Blocked oil or sweat glands.  · Blocked and infected hair follicles.  · A cyst that forms beneath your skin (sebaceous cyst) and becomes infected.  What increases the risk?  This condition is more likely to develop in people who:  · Have a weak body defense system (immune system).  · Have diabetes.  · Have dry and irritated skin.  · Get frequent injections or use illegal IV drugs.  · Have a foreign body in a wound, such as a splinter.  · Have problems with their lymph system or veins.  What are the signs or symptoms?  Symptoms of this condition include:  · A painful, firm bump under the skin.  · A bump with pus at the top. This may break through the skin and drain.  Other symptoms include:  · Redness surrounding the abscess site.  · Warmth.  · Swelling of the lymph nodes (glands) near the abscess.  · Tenderness.  · A sore on the skin.  How is this diagnosed?  This condition may be diagnosed based on:  · A physical exam.  · Your medical history.  · A sample of pus. This may be used to find out what is causing the infection.  · Blood tests.  · Imaging tests, such as an ultrasound, CT scan, or MRI.  How is this treated?  A small abscess that drains on its own may not  need treatment. Treatment for larger abscesses may include:  · Moist heat or heat pack applied to the area several times a day.  · A procedure to drain the abscess (incision and drainage).  · Antibiotic medicines. For a severe abscess, you may first get antibiotics through an IV and then change to antibiotics by mouth.  Follow these instructions at home:  Medicines    · Take over-the-counter and prescription medicines only as told by your health care provider.  · If you were prescribed an antibiotic medicine, take it as told by your health care provider. Do not stop taking the antibiotic even if you start to feel better.  Abscess care    · If you have an abscess that has not drained, apply heat to the affected area. Use the heat source that your health care provider recommends, such as a moist heat pack or a heating pad.  ? Place a towel between your skin and the heat source.  ? Leave the heat on for 20-30 minutes.  ? Remove the heat if your skin turns bright red. This is especially important if you are unable to feel pain, heat, or cold. You may have a greater risk of getting burned.  · Follow instructions from your health care provider about how to take care of your abscess. Make sure you:  ? Cover the abscess with a bandage (dressing).  ? Change your dressing or gauze as told by your health care provider.  ? Wash your hands with soap and water before you change the dressing or gauze. If soap and water are not available, use hand .  · Check your abscess every day for signs of a worsening infection. Check for:  ? More redness, swelling, or pain.  ? More fluid or blood.  ? Warmth.  ? More pus or a bad smell.  General instructions  · To avoid spreading the infection:  ? Do not share personal care items, towels, or hot tubs with others.  ? Avoid making skin contact with other people.  · Keep all follow-up visits as told by your health care provider. This is important.  Contact a health care provider if you  have:  · More redness, swelling, or pain around your abscess.  · More fluid or blood coming from your abscess.  · Warm skin around your abscess.  · More pus or a bad smell coming from your abscess.  · A fever.  · Muscle aches.  · Chills or a general ill feeling.  Get help right away if you:  · Have severe pain.  · See red streaks on your skin spreading away from the abscess.  Summary  · A skin abscess is an infected area on or under your skin that contains a collection of pus and other material.  · A small abscess that drains on its own may not need treatment.  · Treatment for larger abscesses may include having a procedure to drain the abscess and taking an antibiotic.  This information is not intended to replace advice given to you by your health care provider. Make sure you discuss any questions you have with your health care provider.  Document Released: 09/27/2006 Document Revised: 01/31/2019 Document Reviewed: 01/31/2019  [employer] Patient Education © 2019 [employer]

## 2019-12-05 ENCOUNTER — RESULTS ENCOUNTER (OUTPATIENT)
Dept: FAMILY MEDICINE CLINIC | Facility: CLINIC | Age: 64
End: 2019-12-05

## 2019-12-05 DIAGNOSIS — E13.9 DIABETES 1.5, MANAGED AS TYPE 2 (HCC): ICD-10-CM

## 2020-02-19 ENCOUNTER — OFFICE VISIT (OUTPATIENT)
Dept: RETAIL CLINIC | Facility: CLINIC | Age: 65
End: 2020-02-19

## 2020-02-19 VITALS
RESPIRATION RATE: 18 BRPM | WEIGHT: 156.8 LBS | HEART RATE: 92 BPM | TEMPERATURE: 98 F | HEIGHT: 64 IN | OXYGEN SATURATION: 94 % | SYSTOLIC BLOOD PRESSURE: 138 MMHG | DIASTOLIC BLOOD PRESSURE: 78 MMHG | BODY MASS INDEX: 26.77 KG/M2

## 2020-02-19 DIAGNOSIS — J01.10 ACUTE NON-RECURRENT FRONTAL SINUSITIS: Primary | ICD-10-CM

## 2020-02-19 PROCEDURE — 99213 OFFICE O/P EST LOW 20 MIN: CPT | Performed by: NURSE PRACTITIONER

## 2020-02-19 RX ORDER — DOXYCYCLINE HYCLATE 100 MG
100 TABLET ORAL 2 TIMES DAILY
Qty: 20 TABLET | Refills: 0 | Status: SHIPPED | OUTPATIENT
Start: 2020-02-19 | End: 2020-02-29

## 2020-02-19 NOTE — PATIENT INSTRUCTIONS
Sinusitis, Adult  Sinusitis is soreness and swelling (inflammation) of your sinuses. Sinuses are hollow spaces in the bones around your face. They are located:  · Around your eyes.  · In the middle of your forehead.  · Behind your nose.  · In your cheekbones.  Your sinuses and nasal passages are lined with a fluid called mucus. Mucus drains out of your sinuses. Swelling can trap mucus in your sinuses. This lets germs (bacteria, virus, or fungus) grow, which leads to infection. Most of the time, this condition is caused by a virus.  What are the causes?  This condition is caused by:  · Allergies.  · Asthma.  · Germs.  · Things that block your nose or sinuses.  · Growths in the nose (nasal polyps).  · Chemicals or irritants in the air.  · Fungus (rare).  What increases the risk?  You are more likely to develop this condition if:  · You have a weak body defense system (immune system).  · You do a lot of swimming or diving.  · You use nasal sprays too much.  · You smoke.  What are the signs or symptoms?  The main symptoms of this condition are pain and a feeling of pressure around the sinuses. Other symptoms include:  · Stuffy nose (congestion).  · Runny nose (drainage).  · Swelling and warmth in the sinuses.  · Headache.  · Toothache.  · A cough that may get worse at night.  · Mucus that collects in the throat or the back of the nose (postnasal drip).  · Being unable to smell and taste.  · Being very tired (fatigue).  · A fever.  · Sore throat.  · Bad breath.  How is this diagnosed?  This condition is diagnosed based on:  · Your symptoms.  · Your medical history.  · A physical exam.  · Tests to find out if your condition is short-term (acute) or long-term (chronic). Your doctor may:  ? Check your nose for growths (polyps).  ? Check your sinuses using a tool that has a light (endoscope).  ? Check for allergies or germs.  ? Do imaging tests, such as an MRI or CT scan.  How is this treated?  Treatment for this condition  depends on the cause and whether it is short-term or long-term.  · If caused by a virus, your symptoms should go away on their own within 10 days. You may be given medicines to relieve symptoms. They include:  ? Medicines that shrink swollen tissue in the nose.  ? Medicines that treat allergies (antihistamines).  ? A spray that treats swelling of the nostrils.   ? Rinses that help get rid of thick mucus in your nose (nasal saline washes).  · If caused by bacteria, your doctor may wait to see if you will get better without treatment. You may be given antibiotic medicine if you have:  ? A very bad infection.  ? A weak body defense system.  · If caused by growths in the nose, you may need to have surgery.  Follow these instructions at home:  Medicines  · Take, use, or apply over-the-counter and prescription medicines only as told by your doctor. These may include nasal sprays.  · If you were prescribed an antibiotic medicine, take it as told by your doctor. Do not stop taking the antibiotic even if you start to feel better.  Hydrate and humidify    · Drink enough water to keep your pee (urine) pale yellow.  · Use a cool mist humidifier to keep the humidity level in your home above 50%.  · Breathe in steam for 10-15 minutes, 3-4 times a day, or as told by your doctor. You can do this in the bathroom while a hot shower is running.  · Try not to spend time in cool or dry air.  Rest  · Rest as much as you can.  · Sleep with your head raised (elevated).  · Make sure you get enough sleep each night.  General instructions    · Put a warm, moist washcloth on your face 3-4 times a day, or as often as told by your doctor. This will help with discomfort.  · Wash your hands often with soap and water. If there is no soap and water, use hand .  · Do not smoke. Avoid being around people who are smoking (secondhand smoke).  · Keep all follow-up visits as told by your doctor. This is important.  Contact a doctor if:  · You  have a fever.  · Your symptoms get worse.  · Your symptoms do not get better within 10 days.  Get help right away if:  · You have a very bad headache.  · You cannot stop throwing up (vomiting).  · You have very bad pain or swelling around your face or eyes.  · You have trouble seeing.  · You feel confused.  · Your neck is stiff.  · You have trouble breathing.  Summary  · Sinusitis is swelling of your sinuses. Sinuses are hollow spaces in the bones around your face.  · This condition is caused by tissues in your nose that become inflamed or swollen. This traps germs. These can lead to infection.  · If you were prescribed an antibiotic medicine, take it as told by your doctor. Do not stop taking it even if you start to feel better.  · Keep all follow-up visits as told by your doctor. This is important.  This information is not intended to replace advice given to you by your health care provider. Make sure you discuss any questions you have with your health care provider.  Document Released: 06/05/2009 Document Revised: 05/20/2019 Document Reviewed: 05/20/2019  [employer] Interactive Patient Education © 2020 [employer]

## 2020-02-19 NOTE — PROGRESS NOTES
"Subjective   Elif Cardona is a 64 y.o. female.       /78 (BP Location: Left arm, Patient Position: Sitting, Cuff Size: Adult)   Pulse 92   Temp 98 °F (36.7 °C) (Oral)   Resp 18   Ht 162.6 cm (64\")   Wt 71.1 kg (156 lb 12.8 oz)   LMP  (LMP Unknown)   SpO2 94%   BMI 26.91 kg/m²     Sinusitis   This is a new problem. The current episode started in the past 7 days. The problem has been gradually worsening since onset. There has been no fever. Her pain is at a severity of 5/10. The pain is moderate. Associated symptoms include chills, congestion, coughing, headaches, sinus pressure and a sore throat. Past treatments include acetaminophen. The treatment provided no relief.        The following portions of the patient's history were reviewed and updated as appropriate: current medications, past family history, past medical history, past social history, past surgical history and problem list.    Review of Systems   Constitutional: Positive for chills and fatigue.   HENT: Positive for congestion, sinus pressure and sore throat.    Eyes: Negative.    Respiratory: Positive for cough.    Cardiovascular: Negative.    Musculoskeletal: Negative.    Allergic/Immunologic: Negative.    Neurological: Positive for headache.       Objective   Physical Exam   Constitutional: She is oriented to person, place, and time. She appears well-developed and well-nourished.   HENT:   Head: Normocephalic and atraumatic.   Right Ear: Hearing, tympanic membrane, external ear and ear canal normal.   Left Ear: Hearing, tympanic membrane, external ear and ear canal normal.   Nose: Sinus tenderness and congestion present. Right sinus exhibits frontal sinus tenderness. Left sinus exhibits frontal sinus tenderness.   Mouth/Throat: Uvula is midline, oropharynx is clear and moist and mucous membranes are normal.   Eyes: Pupils are equal, round, and reactive to light. Conjunctivae and EOM are normal.   Neck: Normal range of motion. "   Cardiovascular: Normal rate, regular rhythm and normal heart sounds.   Pulmonary/Chest: Effort normal and breath sounds normal.   Musculoskeletal: Normal range of motion.   Neurological: She is alert and oriented to person, place, and time.   Skin: Skin is warm. Capillary refill takes less than 2 seconds.   Psychiatric: She has a normal mood and affect.   Vitals reviewed.        Assessment/Plan   Elif was seen today for sinusitis.    Diagnoses and all orders for this visit:    Acute non-recurrent frontal sinusitis  -     doxycycline (VIBRAMYICN) 100 MG tablet; Take 1 tablet by mouth 2 (Two) Times a Day for 10 days.        Sinusitis, Adult  Sinusitis is soreness and swelling (inflammation) of your sinuses. Sinuses are hollow spaces in the bones around your face. They are located:  · Around your eyes.  · In the middle of your forehead.  · Behind your nose.  · In your cheekbones.  Your sinuses and nasal passages are lined with a fluid called mucus. Mucus drains out of your sinuses. Swelling can trap mucus in your sinuses. This lets germs (bacteria, virus, or fungus) grow, which leads to infection. Most of the time, this condition is caused by a virus.  What are the causes?  This condition is caused by:  · Allergies.  · Asthma.  · Germs.  · Things that block your nose or sinuses.  · Growths in the nose (nasal polyps).  · Chemicals or irritants in the air.  · Fungus (rare).  What increases the risk?  You are more likely to develop this condition if:  · You have a weak body defense system (immune system).  · You do a lot of swimming or diving.  · You use nasal sprays too much.  · You smoke.  What are the signs or symptoms?  The main symptoms of this condition are pain and a feeling of pressure around the sinuses. Other symptoms include:  · Stuffy nose (congestion).  · Runny nose (drainage).  · Swelling and warmth in the sinuses.  · Headache.  · Toothache.  · A cough that may get worse at night.  · Mucus that collects  in the throat or the back of the nose (postnasal drip).  · Being unable to smell and taste.  · Being very tired (fatigue).  · A fever.  · Sore throat.  · Bad breath.  How is this diagnosed?  This condition is diagnosed based on:  · Your symptoms.  · Your medical history.  · A physical exam.  · Tests to find out if your condition is short-term (acute) or long-term (chronic). Your doctor may:  ? Check your nose for growths (polyps).  ? Check your sinuses using a tool that has a light (endoscope).  ? Check for allergies or germs.  ? Do imaging tests, such as an MRI or CT scan.  How is this treated?  Treatment for this condition depends on the cause and whether it is short-term or long-term.  · If caused by a virus, your symptoms should go away on their own within 10 days. You may be given medicines to relieve symptoms. They include:  ? Medicines that shrink swollen tissue in the nose.  ? Medicines that treat allergies (antihistamines).  ? A spray that treats swelling of the nostrils.   ? Rinses that help get rid of thick mucus in your nose (nasal saline washes).  · If caused by bacteria, your doctor may wait to see if you will get better without treatment. You may be given antibiotic medicine if you have:  ? A very bad infection.  ? A weak body defense system.  · If caused by growths in the nose, you may need to have surgery.  Follow these instructions at home:  Medicines  · Take, use, or apply over-the-counter and prescription medicines only as told by your doctor. These may include nasal sprays.  · If you were prescribed an antibiotic medicine, take it as told by your doctor. Do not stop taking the antibiotic even if you start to feel better.  Hydrate and humidify    · Drink enough water to keep your pee (urine) pale yellow.  · Use a cool mist humidifier to keep the humidity level in your home above 50%.  · Breathe in steam for 10-15 minutes, 3-4 times a day, or as told by your doctor. You can do this in the bathroom  while a hot shower is running.  · Try not to spend time in cool or dry air.  Rest  · Rest as much as you can.  · Sleep with your head raised (elevated).  · Make sure you get enough sleep each night.  General instructions    · Put a warm, moist washcloth on your face 3-4 times a day, or as often as told by your doctor. This will help with discomfort.  · Wash your hands often with soap and water. If there is no soap and water, use hand .  · Do not smoke. Avoid being around people who are smoking (secondhand smoke).  · Keep all follow-up visits as told by your doctor. This is important.  Contact a doctor if:  · You have a fever.  · Your symptoms get worse.  · Your symptoms do not get better within 10 days.  Get help right away if:  · You have a very bad headache.  · You cannot stop throwing up (vomiting).  · You have very bad pain or swelling around your face or eyes.  · You have trouble seeing.  · You feel confused.  · Your neck is stiff.  · You have trouble breathing.  Summary  · Sinusitis is swelling of your sinuses. Sinuses are hollow spaces in the bones around your face.  · This condition is caused by tissues in your nose that become inflamed or swollen. This traps germs. These can lead to infection.  · If you were prescribed an antibiotic medicine, take it as told by your doctor. Do not stop taking it even if you start to feel better.  · Keep all follow-up visits as told by your doctor. This is important.  This information is not intended to replace advice given to you by your health care provider. Make sure you discuss any questions you have with your health care provider.  Document Released: 06/05/2009 Document Revised: 05/20/2019 Document Reviewed: 05/20/2019  [employer] Interactive Patient Education © 2020 [employer]

## 2021-03-22 ENCOUNTER — BULK ORDERING (OUTPATIENT)
Dept: CASE MANAGEMENT | Facility: OTHER | Age: 66
End: 2021-03-22

## 2021-03-22 DIAGNOSIS — Z23 IMMUNIZATION DUE: ICD-10-CM

## 2021-06-21 ENCOUNTER — HOSPITAL ENCOUNTER (EMERGENCY)
Facility: HOSPITAL | Age: 66
Discharge: HOME | End: 2021-06-21
Attending: EMERGENCY MEDICINE | Admitting: EMERGENCY MEDICINE

## 2021-06-21 ENCOUNTER — APPOINTMENT (OUTPATIENT)
Dept: GENERAL RADIOLOGY | Facility: HOSPITAL | Age: 66
End: 2021-06-21

## 2021-06-21 VITALS
TEMPERATURE: 98.1 F | BODY MASS INDEX: 26.91 KG/M2 | HEART RATE: 92 BPM | SYSTOLIC BLOOD PRESSURE: 146 MMHG | RESPIRATION RATE: 16 BRPM | OXYGEN SATURATION: 97 % | DIASTOLIC BLOOD PRESSURE: 86 MMHG | HEIGHT: 64 IN

## 2021-06-21 DIAGNOSIS — S51.812A SKIN TEAR OF LEFT FOREARM WITHOUT COMPLICATION, INITIAL ENCOUNTER: Primary | ICD-10-CM

## 2021-06-21 DIAGNOSIS — S50.12XA CONTUSION OF LEFT FOREARM, INITIAL ENCOUNTER: ICD-10-CM

## 2021-06-21 DIAGNOSIS — T14.8XXA CONTUSION OF LEFT CLAVICLE, INITIAL ENCOUNTER: ICD-10-CM

## 2021-06-21 PROCEDURE — 99283 EMERGENCY DEPT VISIT LOW MDM: CPT

## 2021-06-21 PROCEDURE — 73000 X-RAY EXAM OF COLLAR BONE: CPT

## 2021-06-21 PROCEDURE — 25010000002 TDAP 5-2.5-18.5 LF-MCG/0.5 SUSPENSION: Performed by: EMERGENCY MEDICINE

## 2021-06-21 PROCEDURE — 90471 IMMUNIZATION ADMIN: CPT | Performed by: EMERGENCY MEDICINE

## 2021-06-21 PROCEDURE — 99284 EMERGENCY DEPT VISIT MOD MDM: CPT

## 2021-06-21 PROCEDURE — 73090 X-RAY EXAM OF FOREARM: CPT

## 2021-06-21 PROCEDURE — 90715 TDAP VACCINE 7 YRS/> IM: CPT | Performed by: EMERGENCY MEDICINE

## 2021-06-21 RX ORDER — HYDROCODONE BITARTRATE AND ACETAMINOPHEN 5; 325 MG/1; MG/1
1 TABLET ORAL ONCE
Status: COMPLETED | OUTPATIENT
Start: 2021-06-21 | End: 2021-06-21

## 2021-06-21 RX ORDER — CYCLOBENZAPRINE HCL 10 MG
10 TABLET ORAL 3 TIMES DAILY PRN
Qty: 30 TABLET | Refills: 0 | OUTPATIENT
Start: 2021-06-21 | End: 2022-01-01

## 2021-06-21 RX ADMIN — TETANUS TOXOID, REDUCED DIPHTHERIA TOXOID AND ACELLULAR PERTUSSIS VACCINE, ADSORBED 0.5 ML: 5; 2.5; 8; 8; 2.5 SUSPENSION INTRAMUSCULAR at 16:25

## 2021-06-21 RX ADMIN — HYDROCODONE BITARTRATE AND ACETAMINOPHEN 1 TABLET: 5; 325 TABLET ORAL at 16:12
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
## 2022-01-01 ENCOUNTER — OFFICE VISIT (OUTPATIENT)
Dept: INTERNAL MEDICINE | Facility: CLINIC | Age: 67
End: 2022-01-01

## 2022-01-01 ENCOUNTER — APPOINTMENT (OUTPATIENT)
Dept: CARDIOLOGY | Facility: HOSPITAL | Age: 67
End: 2022-01-01

## 2022-01-01 ENCOUNTER — TELEPHONE (OUTPATIENT)
Dept: INTERNAL MEDICINE | Facility: CLINIC | Age: 67
End: 2022-01-01

## 2022-01-01 VITALS
OXYGEN SATURATION: 95 % | DIASTOLIC BLOOD PRESSURE: 80 MMHG | WEIGHT: 134.2 LBS | HEIGHT: 64 IN | HEART RATE: 98 BPM | BODY MASS INDEX: 22.91 KG/M2 | SYSTOLIC BLOOD PRESSURE: 137 MMHG | TEMPERATURE: 97.3 F

## 2022-01-01 VITALS
WEIGHT: 138.8 LBS | HEIGHT: 64 IN | HEART RATE: 70 BPM | DIASTOLIC BLOOD PRESSURE: 78 MMHG | BODY MASS INDEX: 23.7 KG/M2 | TEMPERATURE: 98.2 F | OXYGEN SATURATION: 96 % | SYSTOLIC BLOOD PRESSURE: 138 MMHG

## 2022-01-01 DIAGNOSIS — Z00.00 ANNUAL PHYSICAL EXAM: ICD-10-CM

## 2022-01-01 DIAGNOSIS — Z23 NEED FOR INFLUENZA VACCINATION: ICD-10-CM

## 2022-01-01 DIAGNOSIS — R25.2 EXERCISE-INDUCED LEG CRAMPS: Primary | ICD-10-CM

## 2022-01-01 DIAGNOSIS — I10 PRIMARY HYPERTENSION: ICD-10-CM

## 2022-01-01 DIAGNOSIS — E11.43 TYPE 2 DIABETES MELLITUS WITH DIABETIC AUTONOMIC NEUROPATHY, WITHOUT LONG-TERM CURRENT USE OF INSULIN: Primary | ICD-10-CM

## 2022-01-01 DIAGNOSIS — R91.1 LUNG NODULE: ICD-10-CM

## 2022-01-01 DIAGNOSIS — Z78.0 POST-MENOPAUSE: ICD-10-CM

## 2022-01-01 DIAGNOSIS — J30.9 ALLERGIC RHINITIS, UNSPECIFIED SEASONALITY, UNSPECIFIED TRIGGER: ICD-10-CM

## 2022-01-01 DIAGNOSIS — Z11.59 ENCOUNTER FOR HEPATITIS C SCREENING TEST FOR LOW RISK PATIENT: ICD-10-CM

## 2022-01-01 DIAGNOSIS — E78.5 HYPERLIPIDEMIA, UNSPECIFIED HYPERLIPIDEMIA TYPE: ICD-10-CM

## 2022-01-01 DIAGNOSIS — Z12.11 SCREENING FOR COLON CANCER: ICD-10-CM

## 2022-01-01 LAB
ALBUMIN SERPL-MCNC: 4.7 G/DL (ref 3.5–5.2)
ALBUMIN/GLOB SERPL: 2.6 G/DL
ALP SERPL-CCNC: 99 U/L (ref 39–117)
ALT SERPL-CCNC: 12 U/L (ref 1–33)
AST SERPL-CCNC: 13 U/L (ref 1–32)
BASOPHILS # BLD AUTO: 0.1 10*3/MM3 (ref 0–0.2)
BASOPHILS NFR BLD AUTO: 1.1 % (ref 0–1.5)
BILIRUB SERPL-MCNC: 0.2 MG/DL (ref 0–1.2)
BUN SERPL-MCNC: 9 MG/DL (ref 8–23)
BUN/CREAT SERPL: 14.5 (ref 7–25)
CALCIUM SERPL-MCNC: 9.9 MG/DL (ref 8.6–10.5)
CHLORIDE SERPL-SCNC: 97 MMOL/L (ref 98–107)
CHOLEST SERPL-MCNC: 243 MG/DL (ref 0–200)
CO2 SERPL-SCNC: 27 MMOL/L (ref 22–29)
CREAT SERPL-MCNC: 0.62 MG/DL (ref 0.57–1)
EGFRCR SERPLBLD CKD-EPI 2021: 99 ML/MIN/1.73
EOSINOPHIL # BLD AUTO: 0.08 10*3/MM3 (ref 0–0.4)
EOSINOPHIL NFR BLD AUTO: 0.9 % (ref 0.3–6.2)
ERYTHROCYTE [DISTWIDTH] IN BLOOD BY AUTOMATED COUNT: 10.8 % (ref 12.3–15.4)
GLOBULIN SER CALC-MCNC: 1.8 GM/DL
GLUCOSE SERPL-MCNC: 457 MG/DL (ref 65–99)
HCT VFR BLD AUTO: 41.3 % (ref 34–46.6)
HCV AB S/CO SERPL IA: <0.1 S/CO RATIO (ref 0–0.9)
HCV AB SERPL QL IA: NORMAL
HDLC SERPL-MCNC: 54 MG/DL (ref 40–60)
HGB BLD-MCNC: 13.9 G/DL (ref 12–15.9)
IMM GRANULOCYTES # BLD AUTO: 0.03 10*3/MM3 (ref 0–0.05)
IMM GRANULOCYTES NFR BLD AUTO: 0.3 % (ref 0–0.5)
LDLC SERPL CALC-MCNC: 143 MG/DL (ref 0–100)
LDLC/HDLC SERPL: 2.56 {RATIO}
LYMPHOCYTES # BLD AUTO: 2.35 10*3/MM3 (ref 0.7–3.1)
LYMPHOCYTES NFR BLD AUTO: 25.1 % (ref 19.6–45.3)
MCH RBC QN AUTO: 31.2 PG (ref 26.6–33)
MCHC RBC AUTO-ENTMCNC: 33.7 G/DL (ref 31.5–35.7)
MCV RBC AUTO: 92.6 FL (ref 79–97)
MONOCYTES # BLD AUTO: 0.61 10*3/MM3 (ref 0.1–0.9)
MONOCYTES NFR BLD AUTO: 6.5 % (ref 5–12)
NEUTROPHILS # BLD AUTO: 6.18 10*3/MM3 (ref 1.7–7)
NEUTROPHILS NFR BLD AUTO: 66.1 % (ref 42.7–76)
NRBC BLD AUTO-RTO: 0 /100 WBC (ref 0–0.2)
PLATELET # BLD AUTO: 248 10*3/MM3 (ref 140–450)
POTASSIUM SERPL-SCNC: 4.2 MMOL/L (ref 3.5–5.2)
PROT SERPL-MCNC: 6.5 G/DL (ref 6–8.5)
RBC # BLD AUTO: 4.46 10*6/MM3 (ref 3.77–5.28)
SODIUM SERPL-SCNC: 138 MMOL/L (ref 136–145)
TRIGL SERPL-MCNC: 254 MG/DL (ref 0–150)
VLDLC SERPL CALC-MCNC: 46 MG/DL (ref 5–40)
WBC # BLD AUTO: 9.35 10*3/MM3 (ref 3.4–10.8)

## 2022-01-01 PROCEDURE — 90471 IMMUNIZATION ADMIN: CPT | Performed by: STUDENT IN AN ORGANIZED HEALTH CARE EDUCATION/TRAINING PROGRAM

## 2022-01-01 PROCEDURE — 99214 OFFICE O/P EST MOD 30 MIN: CPT | Performed by: STUDENT IN AN ORGANIZED HEALTH CARE EDUCATION/TRAINING PROGRAM

## 2022-01-01 PROCEDURE — 99204 OFFICE O/P NEW MOD 45 MIN: CPT | Performed by: STUDENT IN AN ORGANIZED HEALTH CARE EDUCATION/TRAINING PROGRAM

## 2022-01-01 PROCEDURE — 90662 IIV NO PRSV INCREASED AG IM: CPT | Performed by: STUDENT IN AN ORGANIZED HEALTH CARE EDUCATION/TRAINING PROGRAM

## 2022-01-01 RX ORDER — GLUCOSAMINE HCL/CHONDROITIN SU 500-400 MG
CAPSULE ORAL
Qty: 100 EACH | Refills: 2 | Status: SHIPPED | OUTPATIENT
Start: 2022-01-01

## 2022-01-01 RX ORDER — AMLODIPINE BESYLATE 5 MG/1
5 TABLET ORAL DAILY
Qty: 30 TABLET | Refills: 2 | Status: SHIPPED | OUTPATIENT
Start: 2022-01-01 | End: 2023-01-01 | Stop reason: SDUPTHER

## 2022-01-01 RX ORDER — PIOGLITAZONEHYDROCHLORIDE 15 MG/1
15 TABLET ORAL DAILY
Qty: 30 TABLET | Refills: 2 | Status: SHIPPED | OUTPATIENT
Start: 2022-01-01

## 2022-01-01 RX ORDER — GLIMEPIRIDE 2 MG/1
2 TABLET ORAL
Qty: 30 TABLET | Refills: 2 | Status: SHIPPED | OUTPATIENT
Start: 2022-01-01 | End: 2022-01-01

## 2022-01-01 RX ORDER — LANCETS
EACH MISCELLANEOUS
Qty: 100 EACH | Refills: 2 | Status: SHIPPED | OUTPATIENT
Start: 2022-01-01

## 2022-01-01 RX ORDER — ATORVASTATIN CALCIUM 20 MG/1
20 TABLET, FILM COATED ORAL DAILY
Qty: 30 TABLET | Refills: 2 | Status: SHIPPED | OUTPATIENT
Start: 2022-01-01 | End: 2022-01-01

## 2022-01-01 RX ORDER — DIPHENHYDRAMINE HYDROCHLORIDE 25 MG/1
CAPSULE, LIQUID FILLED ORAL
Qty: 1 EACH | Refills: 0 | Status: SHIPPED | OUTPATIENT
Start: 2022-01-01 | End: 2023-01-01

## 2022-01-01 RX ORDER — GLIMEPIRIDE 2 MG/1
2 TABLET ORAL
Qty: 30 TABLET | Refills: 2 | Status: SHIPPED | OUTPATIENT
Start: 2022-01-01 | End: 2023-01-01 | Stop reason: SDUPTHER

## 2022-01-01 RX ORDER — ATORVASTATIN CALCIUM 20 MG/1
20 TABLET, FILM COATED ORAL DAILY
Qty: 30 TABLET | Refills: 2 | Status: SHIPPED | OUTPATIENT
Start: 2022-01-01 | End: 2023-01-01

## 2022-11-02 PROBLEM — E11.43 TYPE 2 DIABETES MELLITUS WITH DIABETIC AUTONOMIC NEUROPATHY, WITHOUT LONG-TERM CURRENT USE OF INSULIN (HCC): Status: ACTIVE | Noted: 2019-05-16

## 2022-11-02 NOTE — PROGRESS NOTES
"  Balbir Bills M.D.  Internal Medicine  [facility]  [address], [address]  [place] [address]  [phone]      Chief Complaint  Establish Care and Annual Exam    SUBJECTIVE    History of Present Illness    Elif Cradona is a 67 y.o. female who presents to the office today as a new patient to establish care.     Previously seen by Dr. Sin.     Hypertension-she has not been on medication. It has been elevated at doctor's and dentist's offices. She has severe allergy to lisinopril with throat swelling. On HCTZ and amlodipine in past but does not remember this. States no one has mentioned she needed medicine for HTN.     Type 2 diabetes-last A1c was 12.5%. Metformin gave her stomach cramps so she is not taking this. She was supposed to go to diabetic educator but never did. She is under the impression she does not have \"severe\" diabetes and may not even need medication for this. She has polyuria. She denies blurry vision. She never started jardiance. Does not want to start on anything \"strong\" for her diabetes.  I told her her A1c is actually high enough that we could start insulin or multiple agents for her diabetes.  We discussed various medication options for diabetes including SGLT2 inhibitors and GLP-1 inhibitors.  She states she she wants to be on whichever 1 is \"less strong\".  I recommended SGLT2 inhibitor given she has GI upset at baseline I think she will tolerate this better.      IBS-on glucosamine but now is better off medicine. Has \"nervous stomach.\" She gets \"stomach problems with Mexican food and pizza    Generalized anxiety disorder, major depressive disorder, schizoaffective disorder    Fibromyalgia    Tobacco use-has cut back. States it's hard to quit with her  in the \"band life\"    Lung nodule? Never got follow up     Cologaurd last year could not be processed    Socia-Her  is a musician and she does dance.     Review of Systems    Allergies   Allergen " Reactions   • Lisinopril Angioedema        Outpatient Medications Marked as Taking for the 11/2/22 encounter (Office Visit) with Balbir Bills MD   Medication Sig Dispense Refill   • cetirizine (ZyrTEC) 10 MG chewable tablet Chew 10 mg daily.     • clobetasol (TEMOVATE) 0.05 % ointment Apply  topically to the appropriate area as directed Daily.     • hydrocortisone 2.5 % lotion Apply  topically.     • lidocaine-hydrocortisone 3-0.5 % cream rectal cream Change order to this dose and still BID prn 85 g 2   • Premarin 0.625 MG/GM vaginal cream APPLY A PEA-SIZED AMOUNT ON FINGERTIP AND PLACE INTO VAGINAL OPENING THREE TIMES PER WEEK     • [DISCONTINUED] Loratadine 10 MG capsule Take  by mouth.          Past Medical History:   Diagnosis Date   • Allergic rhinitis    • Benign essential hypertension 01/06/2015   • Fibromyalgia    • Generalized anxiety disorder 08/24/2012   • H/O complete eye exam 2016   • H/O exercise stress test 05/05/2005    CVA neg   • Hemorrhoids 01/27/2010   • History of bone density study    • Irritable bowel syndrome (IBS) 11/11/2009   • Major depressive disorder, recurrent episode, in full remission (HCC) 09/10/2010   • Osteoarthritis, multiple sites 06/12/2014   • Post-menopausal    • Rash and nonspecific skin eruption 07/08/2014    Angular Chelitis   • Schizoaffective disorder (HCC)    • Schizoaffective disorder, bipolar type (HCC)    • Sebaceous cyst 10/25/2011   • Sinusitis, acute      Past Surgical History:   Procedure Laterality Date   • MAMMO BILATERAL  05/2017     Family History   Problem Relation Age of Onset   • Colon cancer Other    • Depression Other    • Diabetes Other    • Heart disease Other    • Lung cancer Other     reports that she has been smoking cigarettes. She started smoking about 47 years ago. She has a 23.50 pack-year smoking history. She has never used smokeless tobacco. She reports that she does not drink alcohol and does not use drugs.    OBJECTIVE    Vital Signs:   BP  "137/80   Pulse 98   Temp 97.3 °F (36.3 °C)   Ht 162.6 cm (64.02\")   Wt 60.9 kg (134 lb 3.2 oz)   SpO2 95%   BMI 23.02 kg/m²     Physical Exam  Constitutional:       Appearance: Normal appearance. She is normal weight.   Cardiovascular:      Rate and Rhythm: Normal rate and regular rhythm.      Heart sounds: Normal heart sounds. No murmur heard.  Pulmonary:      Effort: Pulmonary effort is normal.      Breath sounds: Normal breath sounds.   Abdominal:      General: Abdomen is flat. There is no distension.      Palpations: Abdomen is soft.      Tenderness: There is no abdominal tenderness.   Skin:     General: Skin is warm and dry.   Neurological:      Mental Status: She is alert.   Psychiatric:         Mood and Affect: Mood normal.         Behavior: Behavior normal.         Thought Content: Thought content normal.        Patient drinking Frappuccino today.    The following data was reviewed by: Balbir Bills MD on 11/02/2022:      Lab Results   Component Value Date    WBC 9.37 08/26/2021    HGB 14.0 08/26/2021    HCT 45.9 08/26/2021    .2 (H) 08/26/2021     08/26/2021     Lab Results   Component Value Date    CHLPL 191 05/10/2019    TRIG 311 (H) 05/10/2019    HDL 43 05/10/2019    LDL 86 05/10/2019     Lab Results   Component Value Date    GLUCOSE 149 (H) 09/03/2019    BUN 14 08/26/2021    CREATININE 0.5 (L) 08/26/2021    EGFRIFNONA 100 09/03/2019    EGFRIFAFRI 121 09/03/2019    BCR 26.0 (H) 08/26/2021    K 4.6 08/26/2021    CO2 29 08/26/2021    CALCIUM 9.8 08/26/2021    PROTENTOTREF 6.8 05/10/2019    ALBUMIN 4.7 08/26/2021    LABIL2 2.0 08/26/2021    AST 19 08/26/2021    ALT 18 08/26/2021                A1C Last 3 Results    HGBA1C Last 3 Results 10/11/22   Hemoglobin A1C 12.5 (A)   (A) Abnormal value       Comments are available for some flowsheets but are not being displayed.                   Data reviewed: Previous GYN notes and CT Previous PCP note by Dr. Sin        Lung bases demonstrate " no consolidation or effusion. 0.7 cm   nodule in   the right lung base, image 8 series 2. It was not seen on the   remote   study of 8/31/2004. Mild bronchiectasis in both lower lobes.     Liver demonstrates homogeneous parenchyma, no definite mass.     Gallbladder does not demonstrate calculi or sludge. No intra or   extrahepatic biliary ductal dilatation.     The spleen is unremarkable. The pancreas demonstrates disease and   of the   pancreatic ducts, the pancreatic duct of Santorini and the   pancreatic   duct of [place]irsung open separately into the duodenum. Both ducts are   dilated, the pancreatic duct of Santorini is dilated to 4.7 mm,   the duct   of [place]irsung is dilated to 5.2 cm. Adrenal glands are within normal   limits.     Kidneys do not demonstrate hydronephrosis or calculi. Urinary   bladder is unremarkable.     Small bowel loops are within normal limits. There is mild chronic   mucosal thickening of the wall of the ascending colon and   transverse   colon, however the wall of the descending colon demonstrates   mural   thickening and very minimal surrounding fat stranding, similar   changes   are seen in the sigmoid colon. Moderately large amount of stool   is seen   in the colon. Diverticulosis of the colon.     No significant retroperitoneal lymphadenopathy.     Impression:   1. Mild colitis of the descending colon and possibly the   sigmoid colon.   No obstruction, perforation or abscess.   2. Pancreatic divisum, the pancreatic ducts are slightly   dilated, no   obstructing mass is seen, ERCP may be performed.   3. 0.7 cm nodule in the right lung base may be further followed   up in 3   months, if available previous examinations may be helpful. The   nodule   was not seen on an or study of 8/31/2004.       ASSESSMENT & PLAN     Diagnoses and all orders for this visit:    1. Type 2 diabetes mellitus with diabetic autonomic neuropathy, without long-term current use of insulin (HCC) (Primary)  Assessment &  "Plan:  Diabetes is worsening.   Medication changes per orders.  I discussed in length with her that her diabetes is very uncontrolled and she needs to be on insulin or multiple medications for this to prevent end organ damage or other complications of diabetes.  She states she would like to be on medication that is \"less strong\" she did agree to start Jardiance.  I am referring her to diabetic nurse educator as well.  Ordering glucose monitoring supplies.  Diabetes will be reassessed in 1 month.    Orders:  -     Microalbumin / Creatinine Urine Ratio - Urine, Clean Catch; Future  -     empagliflozin (Jardiance) 10 MG tablet tablet; Take 1 tablet by mouth Daily for 30 days.  Dispense: 30 tablet; Refill: 2  -     Ambulatory Referral to Diabetic Education  -     Blood Glucose Monitoring Suppl (Blood Glucose Monitor System) w/Device kit; Use to check finger stick blood sugar _ times per day.  Dispense: 1 each; Refill: 0  -     Glucose Blood (Blood Glucose Test) strip; Use with blood glucose meter to check blood sugar 1 times per day.  Dispense: 100 each; Refill: 2  -     Lancets Thin misc; Use with lancet device to check blood glucose 1 times per day via finger stick.  Dispense: 100 each; Refill: 2  -     Microalbumin / Creatinine Urine Ratio - Urine, Clean Catch    2. Need for influenza vaccination  -     Fluzone High-Dose 65+yrs (5021-5345)    3. Lung nodule  Comments:  Seen on CT scan in 2017.  Patient was supposed to have repeat imaging but never did.  Orders:  -     CT Chest With Contrast; Future    4. Screening for colon cancer  Comments:  Cologuard done last year was not a good sample.  Patient agreeable for colonoscopy.  Orders:  -     Ambulatory Referral For Screening Colonoscopy    5. Annual physical exam  -     Comprehensive Metabolic Panel  -     CBC & Differential  -     Lipid Panel With LDL / HDL Ratio    6. Post-menopause  -     DEXA Bone Density Axial    7. Encounter for hepatitis C screening test for " low risk patient  -     HCV Antibody Rfx To Qnt PCR    8. Primary hypertension  Assessment & Plan:  Hypertension is unchanged.  Blood pressure elevated at her appointment today.  Patient denies having diagnosis of hypertension however chart review seems to not support this.  I encouraged her to check her blood pressure on a daily basis at home and call us if elevated greater than 130/80 on a consistent basis.  Of note she does have a severe lisinopril allergy.  Dietary sodium restriction.  Weight loss.  Regular aerobic exercise.  Stop smoking.  Blood pressure will be reassessed in 4 weeks.      Discussed that diabetes may improve with improved diet and weight loss however given her current A1c she does need medications.  Patient states that she enjoys exercising with dance.  We did discuss that a large part of diabetes is genetic as well.  Hopefully diabetic nurse educator can help  on diet as well.    Health Maintenance Due   Topic Date Due   • ANNUAL PHYSICAL  Never done   • Pneumococcal Vaccine 65+ (1 - PCV) 10/12/1961   • COLORECTAL CANCER SCREENING  01/01/2018   • DXA SCAN  05/01/2019   • DIABETIC EYE EXAM  01/01/2020   • PAP SMEAR  05/01/2020   • DIABETIC FOOT EXAM  09/03/2020   • URINE MICROALBUMIN  09/03/2020   • COVID-19 Vaccine (4 - Booster for Pfizer series) 03/09/2022        Follow Up  Return in about 4 weeks (around 11/30/2022) for Recheck.    Patient/family had no further questions at this time and verbalized understanding of the plan discussed today.

## 2022-11-02 NOTE — ASSESSMENT & PLAN NOTE
Hypertension is unchanged.  Blood pressure elevated at her appointment today.  Patient denies having diagnosis of hypertension however chart review seems to not support this.  I encouraged her to check her blood pressure on a daily basis at home and call us if elevated greater than 130/80 on a consistent basis.  Of note she does have a severe lisinopril allergy.  Dietary sodium restriction.  Weight loss.  Regular aerobic exercise.  Stop smoking.  Blood pressure will be reassessed in 4 weeks.

## 2022-11-02 NOTE — ASSESSMENT & PLAN NOTE
"Diabetes is worsening.   Medication changes per orders.  I discussed in length with her that her diabetes is very uncontrolled and she needs to be on insulin or multiple medications for this to prevent end organ damage or other complications of diabetes.  She states she would like to be on medication that is \"less strong\" she did agree to start Jardiance.  I am referring her to diabetic nurse educator as well.  Ordering glucose monitoring supplies.  Diabetes will be reassessed in 1 month.  "

## 2022-11-03 NOTE — TELEPHONE ENCOUNTER
I called patient today to discuss labs.  Her blood glucose was very elevated.  I suspect this is due to her drinking a [employer] frozen beverage right before labs.  I warned herthat she could be at risk for diabetic emergencies and endorgan damage with this high of a blood sugar.  She voiced understanding.  She states she did get glucometer.  It is going to be a few days before the prior authorization goes through on Jardiance we will start glimepiride in the meantime.  Patient is appropriate to start multiple agents due to her A1c being 12.5%.  She also needs a moderate intensity statin for primary prevention of cardiovascular disease in setting of her diabetes.

## 2022-11-09 NOTE — TELEPHONE ENCOUNTER
I called Elif Cardona at  at 14:23 EST     I told her that it looks like Jardiance has been rejected by her insurance.  She was able to  glimepiride.  She states she has not been checking her blood glucose.  I encouraged her to do this so that we can adjust her glimepiride dosage.

## 2022-12-01 PROBLEM — E78.5 HYPERLIPIDEMIA: Status: ACTIVE | Noted: 2022-01-01

## 2022-12-01 NOTE — PATIENT INSTRUCTIONS
Keep a log of you blood pressure and bring to next appointment. Bring BP cuff to next appointment.     Bring blood sugar log to next appointment.

## 2022-12-01 NOTE — ASSESSMENT & PLAN NOTE
-Goal A1c for this patient is less than 7.0%  -Current diabetes regimen: Glimepiride 2 mg daily  -Summary of patient's most recent blood glucose trends at home:  -Changes made to diabetes regimen today:   -Microalbuminuria screen: Not done yet  -Discussed importance of nutritionist consult for developing health eating habits to support their success with diabetes. Patient declined nutrition consult.   -Discussed with patient the importance of yearly eye exams to check for retinopathy in patients with diabetes. Patient has not had an exam in the last year. If not, patient declined referral to ophthalmology.  She has her own optometrist that she sees.  -Discussed importance of yearly minimum diabetic foot exam. Discussed with patient the importance of daily self foot care by visualizing both feet including the soles and between the toes; keep feet dry by regularly changing socks and shoes and drying feet thoroughly after baths and exercise; report any new lesions/discolorations/or swelling; do not walk barefoot, even while indoors; avoid shoes that are too small/tight/or rub against any area of the foot; change shoes every year at a minimum.   -No evidence of foot ulcers, loss of protective sensation or nail disease on today's exam.  -There is no evidence of lower vascular compromise on today's exam.  -ASCVD risk The 10-year ASCVD risk score (Connor FLANNERY, et al., 2019) is: 30.6%    Values used to calculate the score:      Age: 67 years      Sex: Female      Is Non- : No      Diabetic: Yes      Tobacco smoker: Yes      Systolic Blood Pressure: 138 mmHg      Is BP treated: Yes      HDL Cholesterol: 54 mg/dL      Total Cholesterol: 243 mg/dL Patient is on statin therapy.

## 2022-12-01 NOTE — ASSESSMENT & PLAN NOTE
Lipid abnormalities are newly identified.  Nutritional counseling was provided. and Pharmacotherapy as ordered.  Lipids will be reassessed 2 months.

## 2022-12-01 NOTE — ASSESSMENT & PLAN NOTE
Hypertension is unchanged.  Dietary sodium restriction.  Regular aerobic exercise.  Stop smoking.  Medication changes per orders.  Ambulatory blood pressure monitoring.  Blood pressure will be reassessed 2 months.

## 2022-12-01 NOTE — PROGRESS NOTES
Balbir Bills M.D.  Internal Medicine  [facility]  [address], [address]  [place] [address]  [phone]      Chief Complaint  Follow-up and Diabetes    SUBJECTIVE    History of Present Illness    Elif Cardona is a 67 y.o. female who presents to the office today as an established patient that last saw me on 11/2/2022.     Diabetes-this is new diagnosis.  She is taking glimepiride for this. She is asymptomatic. -353 with most in 200s. Right now she is just taking glimepiride.  She was unable to  Jardiance because this was rejected by her insurance.    Hypertension-her blood pressure was elevated at her last appointment.  I asked her to check her blood pressure on a regular basis at home.   She does not check blood pressure at home.  She has a blood pressure monitor that she thinks is not working.    She had a cough after last visit that is nagging. She had cold symptoms that resolved but cough is nagging. Mucinex not that helpful. Claritin and flonase don't help.     Review of Systems    Allergies   Allergen Reactions   • Lisinopril Angioedema        Outpatient Medications Marked as Taking for the 12/1/22 encounter (Office Visit) with Balbir Bills MD   Medication Sig Dispense Refill   • atorvastatin (LIPITOR) 20 MG tablet Take 1 tablet by mouth Daily. For cholesterol 30 tablet 2   • Blood Glucose Monitoring Suppl (Blood Glucose Monitor System) w/Device kit Use to check finger stick blood sugar _ times per day. 1 each 0   • cetirizine (ZyrTEC) 10 MG chewable tablet Chew 10 mg daily.     • clobetasol (TEMOVATE) 0.05 % ointment Apply  topically to the appropriate area as directed Daily.     • glimepiride (Amaryl) 2 MG tablet Take 1 tablet by mouth Every Morning Before Breakfast. For diabetes 30 tablet 2   • Glucose Blood (Blood Glucose Test) strip Use with blood glucose meter to check blood sugar 1 times per day. 100 each 2   • hydrocortisone 2.5 % lotion Apply  topically.    "  • Lancets Thin misc Use with lancet device to check blood glucose 1 times per day via finger stick. 100 each 2   • lidocaine-hydrocortisone 3-0.5 % cream rectal cream Change order to this dose and still BID prn 85 g 2   • Premarin 0.625 MG/GM vaginal cream APPLY A PEA-SIZED AMOUNT ON FINGERTIP AND PLACE INTO VAGINAL OPENING THREE TIMES PER WEEK          Past Medical History:   Diagnosis Date   • Allergic rhinitis    • Benign essential hypertension 01/06/2015   • Fibromyalgia    • Generalized anxiety disorder 08/24/2012   • H/O complete eye exam 2016   • H/O exercise stress test 05/05/2005    CVA neg   • Hemorrhoids 01/27/2010   • History of bone density study    • Irritable bowel syndrome (IBS) 11/11/2009   • Major depressive disorder, recurrent episode, in full remission (HCC) 09/10/2010   • Osteoarthritis, multiple sites 06/12/2014   • Post-menopausal    • Rash and nonspecific skin eruption 07/08/2014    Angular Chelitis   • Schizoaffective disorder (HCC)    • Schizoaffective disorder, bipolar type (HCC)    • Sebaceous cyst 10/25/2011   • Sinusitis, acute      Past Surgical History:   Procedure Laterality Date   • MAMMO BILATERAL  05/2017     Family History   Problem Relation Age of Onset   • Colon cancer Other    • Depression Other    • Diabetes Other    • Heart disease Other    • Lung cancer Other     reports that she has been smoking cigarettes. She started smoking about 47 years ago. She has a 23.50 pack-year smoking history. She has never used smokeless tobacco. She reports that she does not drink alcohol and does not use drugs.    OBJECTIVE    Vital Signs:   /78   Pulse 70   Temp 98.2 °F (36.8 °C)   Ht 162.6 cm (64.02\")   Wt 63 kg (138 lb 12.8 oz)   SpO2 96%   BMI 23.81 kg/m²     Physical Exam  Constitutional:       Appearance: Normal appearance.   HENT:      Mouth/Throat:      Mouth: Mucous membranes are moist.      Pharynx: Posterior oropharyngeal erythema present.   Cardiovascular:      Rate " and Rhythm: Normal rate and regular rhythm.      Heart sounds: Normal heart sounds. No murmur heard.  Pulmonary:      Effort: Pulmonary effort is normal.      Breath sounds: Normal breath sounds.   Abdominal:      General: Abdomen is flat. There is no distension.      Palpations: Abdomen is soft.      Tenderness: There is no abdominal tenderness.   Musculoskeletal:      Right lower leg: No edema.      Left lower leg: No edema.   Skin:     General: Skin is warm and dry.   Neurological:      Mental Status: She is alert.   Psychiatric:         Mood and Affect: Mood normal.         Behavior: Behavior normal.         Thought Content: Thought content normal.            The following data was reviewed by: Balbir Bills MD on 12/01/2022:  CMP    CMP 11/2/22   Glucose 457 (A)   BUN 9   Creatinine 0.62   Sodium 138   Potassium 4.2   Chloride 97 (A)   Calcium 9.9   Total Protein 6.5   Albumin 4.70   Globulin 1.8   Total Bilirubin 0.2   Alkaline Phosphatase 99   AST (SGOT) 13   ALT (SGPT) 12   (A) Abnormal value            CBC w/diff    CBC w/Diff 11/2/22   WBC 9.35   RBC 4.46   Hemoglobin 13.9   Hematocrit 41.3   MCV 92.6   MCH 31.2   MCHC 33.7   RDW 10.8 (A)   Platelets 248   Neutrophil Rel % 66.1   Lymphocyte Rel % 25.1   Monocyte Rel % 6.5   Eosinophil Rel % 0.9   Basophil Rel % 1.1   (A) Abnormal value            Lipid Panel    Lipid Panel 11/2/22   Total Cholesterol 243 (A)   Triglycerides 254 (A)   HDL Cholesterol 54   VLDL Cholesterol 46 (A)   LDL Cholesterol  143 (A)   LDL/HDL Ratio 2.56   (A) Abnormal value       Comments are available for some flowsheets but are not being displayed.           A1C Last 3 Results    HGBA1C Last 3 Results 10/11/22   Hemoglobin A1C 12.5 (A)   (A) Abnormal value       Comments are available for some flowsheets but are not being displayed.           Data reviewed: Note from last visit.             ASSESSMENT & PLAN     Diagnoses and all orders for this visit:    1. Type 2 diabetes mellitus  with diabetic autonomic neuropathy, without long-term current use of insulin ([facility]) (Primary)  Assessment & Plan:  -Goal A1c for this patient is less than 7.0%  -Current diabetes regimen: Glimepiride 2 mg daily  -Summary of patient's most recent blood glucose trends at home:  -Changes made to diabetes regimen today:   -Microalbuminuria screen: Not done yet  -Discussed importance of nutritionist consult for developing health eating habits to support their success with diabetes. Patient declined nutrition consult.   -Discussed with patient the importance of yearly eye exams to check for retinopathy in patients with diabetes. Patient has not had an exam in the last year. If not, patient declined referral to ophthalmology.  She has her own optometrist that she sees.  -Discussed importance of yearly minimum diabetic foot exam. Discussed with patient the importance of daily self foot care by visualizing both feet including the soles and between the toes; keep feet dry by regularly changing socks and shoes and drying feet thoroughly after baths and exercise; report any new lesions/discolorations/or swelling; do not walk barefoot, even while indoors; avoid shoes that are too small/tight/or rub against any area of the foot; change shoes every year at a minimum.   -No evidence of foot ulcers, loss of protective sensation or nail disease on today's exam.  -There is no evidence of lower vascular compromise on today's exam.  -ASCVD risk The 10-year ASCVD risk score (Connor FLANNERY, et al., 2019) is: 30.6%    Values used to calculate the score:      Age: 67 years      Sex: Female      Is Non- : No      Diabetic: Yes      Tobacco smoker: Yes      Systolic Blood Pressure: 138 mmHg      Is BP treated: Yes      HDL Cholesterol: 54 mg/dL      Total Cholesterol: 243 mg/dL Patient is on statin therapy.       Orders:  -     SITagliptin (Januvia) 100 MG tablet; Take 1 tablet by mouth Daily.  Dispense: 30 tablet; Refill:  2  -     Hemoglobin A1c; Future    2. Primary hypertension  Assessment & Plan:  Hypertension is unchanged.  Dietary sodium restriction.  Regular aerobic exercise.  Stop smoking.  Medication changes per orders.  Ambulatory blood pressure monitoring.  Blood pressure will be reassessed 2 months.    Orders:  -     amLODIPine (NORVASC) 5 MG tablet; Take 1 tablet by mouth Daily.  Dispense: 30 tablet; Refill: 2    3. Hyperlipidemia, unspecified hyperlipidemia type  Assessment & Plan:  Lipid abnormalities are newly identified.  Nutritional counseling was provided. and Pharmacotherapy as ordered.  Lipids will be reassessed 2 months.    Orders:  -     Lipid Panel With LDL / HDL Ratio; Future    4. Allergic rhinitis, unspecified seasonality, unspecified trigger  Comments:  Encouraged her to aim her Flonase more laterally so that it gets in her sinuses.      -A1c trends on file for this patient:   A1C Last 3 Results    HGBA1C Last 3 Results 10/11/22   Hemoglobin A1C 12.5 (A)   (A) Abnormal value       Comments are available for some flowsheets but are not being displayed.                 Health Maintenance Due   Topic Date Due   • ANNUAL PHYSICAL  Never done   • Pneumococcal Vaccine 65+ (1 - PCV) 10/12/1961   • LUNG CANCER SCREENING  Never done   • DXA SCAN  05/01/2019   • DIABETIC EYE EXAM  01/01/2020   • PAP SMEAR  05/01/2020   • DIABETIC FOOT EXAM  09/03/2020   • URINE MICROALBUMIN  09/03/2020   • COLORECTAL CANCER SCREENING  09/08/2021   • COVID-19 Vaccine (4 - Booster for Pfizer series) 03/09/2022        Follow Up  Return in about 8 weeks (around 1/26/2023) for Recheck.    Patient/family had no further questions at this time and verbalized understanding of the plan discussed today.

## 2022-12-06 NOTE — TELEPHONE ENCOUNTER
I called patient to discuss her medication concerns.  States that she has been having stomach upset and diarrhea with Januvia.  Also Januvia was $100 which she cannot afford.  Discussed that stomach upset is not a common side effect of Januvia.  She is asking to switch to a different medication.  Jardiance was also not approved by her insurance.  Discussed our options are GLP-1 inhibitors which can also be expensive.  Another option is Actos which can cause lower extremity swelling and increases risk of bladder cancer.  Discussed that another affordable option would be insulin which would work well to lower her blood sugar with risks being hypoglycemia and weight gain.  Patient stated she would like to try Actos.  I sent prescription to her pharmacy.  Patient also noted that she has been having leg cramping when she is dancing and is wondering if she has poor circulation.  I will order ABIs for this.

## 2022-12-06 NOTE — TELEPHONE ENCOUNTER
Caller: Brendan Elif IRVIN    Relationship: Self    Best call back number: [phone]    What medications are you currently taking:   Current Outpatient Medications on File Prior to Visit   Medication Sig Dispense Refill   • amLODIPine (NORVASC) 5 MG tablet Take 1 tablet by mouth Daily. 30 tablet 2   • atorvastatin (LIPITOR) 20 MG tablet Take 1 tablet by mouth Daily. For cholesterol 30 tablet 2   • Blood Glucose Monitoring Suppl (Blood Glucose Monitor System) w/Device kit Use to check finger stick blood sugar _ times per day. 1 each 0   • cetirizine (ZyrTEC) 10 MG chewable tablet Chew 10 mg daily.     • clobetasol (TEMOVATE) 0.05 % ointment Apply  topically to the appropriate area as directed Daily.     • glimepiride (Amaryl) 2 MG tablet Take 1 tablet by mouth Every Morning Before Breakfast. For diabetes 30 tablet 2   • Glucose Blood (Blood Glucose Test) strip Use with blood glucose meter to check blood sugar 1 times per day. 100 each 2   • hydrocortisone 2.5 % lotion Apply  topically.     • Lancets Thin misc Use with lancet device to check blood glucose 1 times per day via finger stick. 100 each 2   • lidocaine-hydrocortisone 3-0.5 % cream rectal cream Change order to this dose and still BID prn 85 g 2   • Premarin 0.625 MG/GM vaginal cream APPLY A PEA-SIZED AMOUNT ON FINGERTIP AND PLACE INTO VAGINAL OPENING THREE TIMES PER WEEK     • SITagliptin (Januvia) 100 MG tablet Take 1 tablet by mouth Daily. 30 tablet 2     No current facility-administered medications on file prior to visit.          When did you start taking these medications: 12/2/22    Which medication are you concerned about: SITagliptin (Januvia) 100 MG tablet    Who prescribed you this medication: DR. FELIPE     What are your concerns: THINKS THE MEDICATION IS TOO STRONG WANTING TO KNOW IF A LOWER DOSE CAN BE PRESCRIBED STATES TOO MUCH ON THE STOMACH   PLEASE CALL AND ADVISE

## 2023-01-01 ENCOUNTER — HOSPITAL ENCOUNTER (EMERGENCY)
Facility: HOSPITAL | Age: 68
End: 2023-03-08
Attending: EMERGENCY MEDICINE | Admitting: EMERGENCY MEDICINE
Payer: COMMERCIAL

## 2023-01-01 ENCOUNTER — OFFICE VISIT (OUTPATIENT)
Dept: INTERNAL MEDICINE | Facility: CLINIC | Age: 68
End: 2023-01-01
Payer: COMMERCIAL

## 2023-01-01 ENCOUNTER — APPOINTMENT (OUTPATIENT)
Dept: CT IMAGING | Facility: HOSPITAL | Age: 68
End: 2023-01-01
Payer: COMMERCIAL

## 2023-01-01 ENCOUNTER — TELEPHONE (OUTPATIENT)
Dept: INTERNAL MEDICINE | Facility: CLINIC | Age: 68
End: 2023-01-01

## 2023-01-01 VITALS
WEIGHT: 157 LBS | DIASTOLIC BLOOD PRESSURE: 84 MMHG | HEART RATE: 97 BPM | OXYGEN SATURATION: 94 % | HEIGHT: 64 IN | SYSTOLIC BLOOD PRESSURE: 150 MMHG | TEMPERATURE: 98.6 F | BODY MASS INDEX: 26.8 KG/M2

## 2023-01-01 VITALS
WEIGHT: 150 LBS | HEIGHT: 64 IN | OXYGEN SATURATION: 95 % | TEMPERATURE: 97.6 F | HEART RATE: 112 BPM | DIASTOLIC BLOOD PRESSURE: 82 MMHG | SYSTOLIC BLOOD PRESSURE: 148 MMHG | BODY MASS INDEX: 25.61 KG/M2

## 2023-01-01 VITALS
HEART RATE: 89 BPM | HEIGHT: 64 IN | TEMPERATURE: 97.7 F | OXYGEN SATURATION: 98 % | WEIGHT: 156 LBS | DIASTOLIC BLOOD PRESSURE: 78 MMHG | BODY MASS INDEX: 26.63 KG/M2 | SYSTOLIC BLOOD PRESSURE: 142 MMHG

## 2023-01-01 VITALS
SYSTOLIC BLOOD PRESSURE: 136 MMHG | WEIGHT: 157.6 LBS | TEMPERATURE: 97.6 F | HEIGHT: 64 IN | DIASTOLIC BLOOD PRESSURE: 86 MMHG | BODY MASS INDEX: 26.91 KG/M2 | OXYGEN SATURATION: 95 % | HEART RATE: 83 BPM

## 2023-01-01 DIAGNOSIS — E11.65 TYPE 2 DIABETES MELLITUS WITH HYPERGLYCEMIA, WITHOUT LONG-TERM CURRENT USE OF INSULIN: Primary | ICD-10-CM

## 2023-01-01 DIAGNOSIS — J06.9 VIRAL UPPER RESPIRATORY TRACT INFECTION: Primary | ICD-10-CM

## 2023-01-01 DIAGNOSIS — K58.9 IRRITABLE BOWEL SYNDROME, UNSPECIFIED TYPE: ICD-10-CM

## 2023-01-01 DIAGNOSIS — I10 PRIMARY HYPERTENSION: ICD-10-CM

## 2023-01-01 DIAGNOSIS — I46.9 ASYSTOLE: ICD-10-CM

## 2023-01-01 DIAGNOSIS — Z12.11 SCREENING FOR COLON CANCER: ICD-10-CM

## 2023-01-01 DIAGNOSIS — R05.9 COUGH, UNSPECIFIED TYPE: ICD-10-CM

## 2023-01-01 DIAGNOSIS — S16.1XXA STRAIN OF NECK MUSCLE, INITIAL ENCOUNTER: Primary | ICD-10-CM

## 2023-01-01 DIAGNOSIS — R82.90 CLOUDY URINE: ICD-10-CM

## 2023-01-01 DIAGNOSIS — E11.65 TYPE 2 DIABETES MELLITUS WITH HYPERGLYCEMIA, WITHOUT LONG-TERM CURRENT USE OF INSULIN: ICD-10-CM

## 2023-01-01 DIAGNOSIS — I46.9 CARDIOPULMONARY ARREST: Primary | ICD-10-CM

## 2023-01-01 DIAGNOSIS — N30.00 ACUTE CYSTITIS WITHOUT HEMATURIA: ICD-10-CM

## 2023-01-01 DIAGNOSIS — S46.912D STRAIN OF LEFT SHOULDER, SUBSEQUENT ENCOUNTER: Primary | ICD-10-CM

## 2023-01-01 DIAGNOSIS — E11.43 TYPE 2 DIABETES MELLITUS WITH DIABETIC AUTONOMIC NEUROPATHY, WITHOUT LONG-TERM CURRENT USE OF INSULIN: ICD-10-CM

## 2023-01-01 LAB
APPEARANCE UR: ABNORMAL
BACTERIA #/AREA URNS HPF: ABNORMAL /HPF
BACTERIA UR CULT: ABNORMAL
BILIRUB BLD-MCNC: NEGATIVE MG/DL
BILIRUB UR QL STRIP: NEGATIVE
CASTS URNS QL MICRO: ABNORMAL /LPF
CLARITY, POC: CLEAR
COLOR UR: YELLOW
COLOR UR: YELLOW
EPI CELLS #/AREA URNS HPF: ABNORMAL /HPF (ref 0–10)
EXPIRATION DATE: ABNORMAL
EXPIRATION DATE: NORMAL
FLUAV AG UPPER RESP QL IA.RAPID: NOT DETECTED
FLUBV AG UPPER RESP QL IA.RAPID: NOT DETECTED
GLUCOSE UR QL STRIP: ABNORMAL
GLUCOSE UR STRIP-MCNC: ABNORMAL MG/DL
HGB UR QL STRIP: ABNORMAL
INTERNAL CONTROL: NORMAL
KETONES UR QL STRIP: NEGATIVE
KETONES UR QL: NEGATIVE
LEUKOCYTE EST, POC: ABNORMAL
LEUKOCYTE ESTERASE UR QL STRIP: ABNORMAL
Lab: ABNORMAL
Lab: NORMAL
MICRO URNS: ABNORMAL
NITRITE UR QL STRIP: NEGATIVE
NITRITE UR-MCNC: NEGATIVE MG/ML
OTHER ANTIBIOTIC SUSC ISLT: ABNORMAL
PH UR STRIP: 5.5 [PH] (ref 5–7.5)
PH UR: 5.5 [PH] (ref 5–8)
PROT UR QL STRIP: NEGATIVE
PROT UR STRIP-MCNC: NEGATIVE MG/DL
RBC # UR STRIP: ABNORMAL /UL
RBC #/AREA URNS HPF: ABNORMAL /HPF (ref 0–2)
SARS-COV-2 AG UPPER RESP QL IA.RAPID: NORMAL
SP GR UR STRIP: 1.01 (ref 1–1.03)
SP GR UR: 1.01 (ref 1–1.03)
URINALYSIS REFLEX: ABNORMAL
UROBILINOGEN UR QL: ABNORMAL
UROBILINOGEN UR STRIP-MCNC: 0.2 MG/DL (ref 0.2–1)
WBC #/AREA URNS HPF: >30 /HPF (ref 0–5)

## 2023-01-01 PROCEDURE — 25010000002 EPINEPHRINE 1 MG/10ML SOLUTION PREFILLED SYRINGE: Performed by: EMERGENCY MEDICINE

## 2023-01-01 PROCEDURE — 99214 OFFICE O/P EST MOD 30 MIN: CPT | Performed by: STUDENT IN AN ORGANIZED HEALTH CARE EDUCATION/TRAINING PROGRAM

## 2023-01-01 PROCEDURE — 87428 SARSCOV & INF VIR A&B AG IA: CPT | Performed by: STUDENT IN AN ORGANIZED HEALTH CARE EDUCATION/TRAINING PROGRAM

## 2023-01-01 PROCEDURE — 99284 EMERGENCY DEPT VISIT MOD MDM: CPT

## 2023-01-01 PROCEDURE — 92950 HEART/LUNG RESUSCITATION CPR: CPT

## 2023-01-01 PROCEDURE — 99213 OFFICE O/P EST LOW 20 MIN: CPT | Performed by: STUDENT IN AN ORGANIZED HEALTH CARE EDUCATION/TRAINING PROGRAM

## 2023-01-01 PROCEDURE — 81003 URINALYSIS AUTO W/O SCOPE: CPT | Performed by: STUDENT IN AN ORGANIZED HEALTH CARE EDUCATION/TRAINING PROGRAM

## 2023-01-01 PROCEDURE — 94799 UNLISTED PULMONARY SVC/PX: CPT

## 2023-01-01 RX ORDER — EPINEPHRINE 0.1 MG/ML
SYRINGE (ML) INJECTION
Status: COMPLETED | OUTPATIENT
Start: 2023-01-01 | End: 2023-01-01

## 2023-01-01 RX ORDER — DICYCLOMINE HYDROCHLORIDE 10 MG/1
10 CAPSULE ORAL 3 TIMES DAILY
Qty: 270 CAPSULE | Refills: 2 | Status: SHIPPED | OUTPATIENT
Start: 2023-01-01

## 2023-01-01 RX ORDER — CYCLOBENZAPRINE HCL 10 MG
10 TABLET ORAL 3 TIMES DAILY PRN
Qty: 30 TABLET | Refills: 0 | Status: SHIPPED | OUTPATIENT
Start: 2023-01-01 | End: 2023-01-01

## 2023-01-01 RX ORDER — FLASH GLUCOSE SENSOR
KIT MISCELLANEOUS
Qty: 6 EACH | Refills: 2 | Status: SHIPPED | OUTPATIENT
Start: 2023-01-01 | End: 2023-01-01 | Stop reason: SDUPTHER

## 2023-01-01 RX ORDER — BENZONATATE 100 MG/1
100 CAPSULE ORAL 3 TIMES DAILY PRN
Qty: 30 CAPSULE | Refills: 0 | Status: SHIPPED | OUTPATIENT
Start: 2023-01-01

## 2023-01-01 RX ORDER — ALBUTEROL SULFATE 90 UG/1
2 AEROSOL, METERED RESPIRATORY (INHALATION) EVERY 4 HOURS PRN
Qty: 6.7 G | Refills: 0 | Status: SHIPPED | OUTPATIENT
Start: 2023-01-01

## 2023-01-01 RX ORDER — GLIMEPIRIDE 4 MG/1
4 TABLET ORAL
Qty: 30 TABLET | Refills: 2 | Status: SHIPPED | OUTPATIENT
Start: 2023-01-01

## 2023-01-01 RX ORDER — CYCLOBENZAPRINE HCL 10 MG
TABLET ORAL
COMMUNITY
Start: 2023-01-01

## 2023-01-01 RX ORDER — MELOXICAM 7.5 MG/1
7.5 TABLET ORAL DAILY
Qty: 14 TABLET | Refills: 0 | Status: SHIPPED | OUTPATIENT
Start: 2023-01-01

## 2023-01-01 RX ORDER — HYDROCORTISONE 25 MG/ML
LOTION TOPICAL 2 TIMES DAILY
Qty: 10 ML | Refills: 0 | Status: SHIPPED | OUTPATIENT
Start: 2023-01-01

## 2023-01-01 RX ORDER — AMLODIPINE BESYLATE 10 MG/1
10 TABLET ORAL DAILY
Qty: 30 TABLET | Refills: 2 | Status: SHIPPED | OUTPATIENT
Start: 2023-01-01

## 2023-01-01 RX ORDER — DIPHENHYDRAMINE HYDROCHLORIDE 25 MG/1
CAPSULE, LIQUID FILLED ORAL
Qty: 1 EACH | Refills: 0 | Status: SHIPPED | OUTPATIENT
Start: 2023-01-01

## 2023-01-01 RX ORDER — AMLODIPINE BESYLATE 5 MG/1
5 TABLET ORAL DAILY
Qty: 30 TABLET | Refills: 2 | OUTPATIENT
Start: 2023-01-01

## 2023-01-01 RX ORDER — ATORVASTATIN CALCIUM 20 MG/1
20 TABLET, FILM COATED ORAL DAILY
Qty: 30 TABLET | Refills: 2 | Status: SHIPPED | OUTPATIENT
Start: 2023-01-01

## 2023-01-01 RX ORDER — FLASH GLUCOSE SENSOR
KIT MISCELLANEOUS
Qty: 6 EACH | Refills: 2 | Status: SHIPPED | OUTPATIENT
Start: 2023-01-01

## 2023-01-01 RX ORDER — NITROFURANTOIN 25; 75 MG/1; MG/1
100 CAPSULE ORAL 2 TIMES DAILY
Qty: 10 CAPSULE | Refills: 0 | Status: SHIPPED | OUTPATIENT
Start: 2023-01-01 | End: 2023-01-01

## 2023-01-01 RX ADMIN — EPINEPHRINE 1 MG: 0.1 INJECTION INTRACARDIAC; INTRAVENOUS at 20:02

## 2023-01-01 RX ADMIN — EPINEPHRINE 1 MG: 0.1 INJECTION INTRACARDIAC; INTRAVENOUS at 19:59

## 2023-01-26 NOTE — PROGRESS NOTES
Balbir Bills M.D.  Internal Medicine  [facility]  [address], [address]  [place] [address]  [phone]      Chief Complaint  Follow-up (DM & HTN )    SUBJECTIVE    History of Present Illness    lEif Cardona is a 67 y.o. female who presents to the office today as an established patient that last saw me on 12/1/2022.     States BP is up today because of traffic. She does not check BP at home since her BP cuff is broken. She has gained 13 pounds since last visit.     Currently she is on Actos 15 mg and, glimepiride 2 mg. States her glucometer is not working well and she has to check multiple times for it to work. BG ranges as low as 130 and is usually under 200. She no longer has polyuria. She does not eat candy. She is cutting back on Frapaccinos. She enjoys dancing for exercise.     She is seeing urology and is under consideration for a hysterectomy. States she follows with GYN but does not know her name. She believes she is up to date and her last pap was about 8 months ago.     She was on hycosamine in past for IBS symptoms so she is wondering if she could get this again. Denies constipation.     She took Boniva for years for osteoporosis.    States she canceled Lung cancer screening because she did not feel she needed this because she is not having symptoms.     Review of Systems    Allergies   Allergen Reactions   • Lisinopril Angioedema        Outpatient Medications Marked as Taking for the 1/26/23 encounter (Office Visit) with Balbir Bills MD   Medication Sig Dispense Refill   • amLODIPine (NORVASC) 10 MG tablet Take 1 tablet by mouth Daily. 30 tablet 2   • atorvastatin (LIPITOR) 20 MG tablet Take 1 tablet by mouth Daily. For cholesterol 30 tablet 2   • Blood Glucose Monitoring Suppl (Blood Glucose Monitor System) w/Device kit Use to check finger stick blood sugar _ times per day. 1 each 0   • cetirizine (ZyrTEC) 10 MG chewable tablet Chew 10 mg daily.     • clobetasol  (TEMOVATE) 0.05 % ointment Apply  topically to the appropriate area as directed Daily.     • glimepiride (Amaryl) 4 MG tablet Take 1 tablet by mouth Every Morning Before Breakfast. For diabetes 30 tablet 2   • Glucose Blood (Blood Glucose Test) strip Use with blood glucose meter to check blood sugar 1 times per day. 100 each 2   • hydrocortisone 2.5 % lotion Apply  topically to the appropriate area as directed 2 (Two) Times a Day. 10 mL 0   • Lancets Thin misc Use with lancet device to check blood glucose 1 times per day via finger stick. 100 each 2   • pioglitazone (Actos) 15 MG tablet Take 1 tablet by mouth Daily. 30 tablet 2   • Premarin 0.625 MG/GM vaginal cream APPLY A PEA-SIZED AMOUNT ON FINGERTIP AND PLACE INTO VAGINAL OPENING THREE TIMES PER WEEK     • [DISCONTINUED] amLODIPine (NORVASC) 5 MG tablet Take 1 tablet by mouth Daily. 30 tablet 2   • [DISCONTINUED] glimepiride (Amaryl) 2 MG tablet Take 1 tablet by mouth Every Morning Before Breakfast. For diabetes 30 tablet 2   • [DISCONTINUED] hydrocortisone 2.5 % lotion Apply  topically.     • [DISCONTINUED] lidocaine-hydrocortisone 3-0.5 % cream rectal cream Change order to this dose and still BID prn 85 g 2        Past Medical History:   Diagnosis Date   • Allergic rhinitis    • Benign essential hypertension 01/06/2015   • Fibromyalgia    • Generalized anxiety disorder 08/24/2012   • H/O complete eye exam 2016   • H/O exercise stress test 05/05/2005    CVA neg   • Hemorrhoids 01/27/2010   • History of bone density study    • Irritable bowel syndrome (IBS) 11/11/2009   • Major depressive disorder, recurrent episode, in full remission (HCC) 09/10/2010   • Osteoarthritis, multiple sites 06/12/2014   • Post-menopausal    • Rash and nonspecific skin eruption 07/08/2014    Angular Chelitis   • Schizoaffective disorder ([facility])    • Schizoaffective disorder, bipolar type ([facility])    • Sebaceous cyst 10/25/2011   • Sinusitis, acute      Past Surgical History:   Procedure  "Laterality Date   • MAMMO BILATERAL  05/2017     Family History   Problem Relation Age of Onset   • Colon cancer Other    • Depression Other    • Diabetes Other    • Heart disease Other    • Lung cancer Other     reports that she has been smoking cigarettes. She started smoking about 48 years ago. She has a 23.50 pack-year smoking history. She has never used smokeless tobacco. She reports that she does not drink alcohol and does not use drugs.    OBJECTIVE    Vital Signs:   /82   Pulse 112   Temp 97.6 °F (36.4 °C)   Ht 162.6 cm (64.02\")   Wt 68 kg (150 lb)   SpO2 95%   BMI 25.73 kg/m²     Physical Exam  Constitutional:       Appearance: Normal appearance. She is normal weight.   Cardiovascular:      Rate and Rhythm: Normal rate and regular rhythm.      Pulses:           Dorsalis pedis pulses are 2+ on the right side and 2+ on the left side.        Posterior tibial pulses are 2+ on the right side and 2+ on the left side.      Heart sounds: Normal heart sounds. No murmur heard.  Pulmonary:      Effort: Pulmonary effort is normal.      Breath sounds: Normal breath sounds.   Abdominal:      General: Abdomen is flat. There is no distension.      Palpations: Abdomen is soft.      Tenderness: There is no abdominal tenderness.   Feet:      Right foot:      Protective Sensation: 7 sites tested. 7 sites sensed.      Skin integrity: Skin integrity normal.      Left foot:      Protective Sensation: 7 sites tested. 7 sites sensed.      Skin integrity: Callus present.      Comments: Wearing heeled boots  Skin:     General: Skin is warm and dry.   Neurological:      Mental Status: She is alert.   Psychiatric:         Mood and Affect: Mood normal.         Behavior: Behavior normal.         Thought Content: Thought content normal.            The following data was reviewed by: Balbir Bills MD on 01/26/2023:  CMP    CMP 11/2/22   Glucose 457 (A)   BUN 9   Creatinine 0.62   Sodium 138   Potassium 4.2   Chloride 97 (A) "   Calcium 9.9   Total Protein 6.5   Albumin 4.70   Globulin 1.8   Total Bilirubin 0.2   Alkaline Phosphatase 99   AST (SGOT) 13   ALT (SGPT) 12   BUN/Creatinine Ratio 14.5   (A) Abnormal value            CBC w/diff    CBC w/Diff 11/2/22   WBC 9.35   RBC 4.46   Hemoglobin 13.9   Hematocrit 41.3   MCV 92.6   MCH 31.2   MCHC 33.7   RDW 10.8 (A)   Platelets 248   Neutrophil Rel % 66.1   Lymphocyte Rel % 25.1   Monocyte Rel % 6.5   Eosinophil Rel % 0.9   Basophil Rel % 1.1   (A) Abnormal value            Lipid Panel    Lipid Panel 11/2/22 1/18/23   Total Cholesterol 243 (A) 125   Triglycerides 254 (A) 68   HDL Cholesterol 54 62 (A)   VLDL Cholesterol 46 (A) 14   LDL Cholesterol  143 (A) 49   LDL/HDL Ratio 2.56 0.80   (A) Abnormal value       Comments are available for some flowsheets but are not being displayed.               A1C Last 3 Results    HGBA1C Last 3 Results 10/11/22 1/18/23   Hemoglobin A1C 12.5 (A) 8.80 (A)   (A) Abnormal value       Comments are available for some flowsheets but are not being displayed.           Data reviewed: Note from last visit.               ASSESSMENT & PLAN     Diagnoses and all orders for this visit:    1. Type 2 diabetes mellitus with hyperglycemia, without long-term current use of insulin (HCC) (Primary)  -     Continuous Blood Gluc Sensor (FreeStyle Manuel Sensor System); Every 10 (Ten) Days.  Dispense: 6 each; Refill: 2  -     glimepiride (Amaryl) 4 MG tablet; Take 1 tablet by mouth Every Morning Before Breakfast. For diabetes  Dispense: 30 tablet; Refill: 2  -     Hemoglobin A1c; Future  -     Ambulatory Referral to Diabetic Education  -     Microalbumin / Creatinine Urine Ratio - Urine, Clean Catch; Future    2. Primary hypertension  -     amLODIPine (NORVASC) 10 MG tablet; Take 1 tablet by mouth Daily.  Dispense: 30 tablet; Refill: 2    3. Screening for colon cancer  -     Ambulatory Referral For Screening Colonoscopy    4. Irritable bowel syndrome, unspecified type  -      dicyclomine (BENTYL) 10 MG capsule; Take 1 capsule by mouth 3 (Three) Times a Day.  Dispense: 270 capsule; Refill: 2    Other orders  -     hydrocortisone 2.5 % lotion; Apply  topically to the appropriate area as directed 2 (Two) Times a Day.  Dispense: 10 mL; Refill: 0        -A1c trends on file for this patient:   A1C Last 3 Results    HGBA1C Last 3 Results 10/11/22 1/18/23   Hemoglobin A1C 12.5 (A) 8.80 (A)   (A) Abnormal value       Comments are available for some flowsheets but are not being displayed.           -Goal A1c for this patient is less than 7.0%  -Current diabetes regimen: Glimepiride and Actos  -Summary of patient's most recent blood glucose trends at home:  -Changes made to diabetes regimen today: Increasing glimepiride today  -Discussed importance of nutritionist consult for developing health eating habits to support their success with diabetes. Patient declined nutrition consult.   -Discussed with patient the importance of yearly eye exams to check for retinopathy in patients with diabetes. Patient has not had an exam in the last year. If not, patient declined referral to ophthalmology.  She will make an appointment with her optometrist  -Discussed importance of yearly minimum diabetic foot exam. Discussed with patient the importance of daily self foot care by visualizing both feet including the soles and between the toes; keep feet dry by regularly changing socks and shoes and drying feet thoroughly after baths and exercise; report any new lesions/discolorations/or swelling; do not walk barefoot, even while indoors; avoid shoes that are too small/tight/or rub against any area of the foot; change shoes every year at a minimum.   -No evidence of foot ulcers, loss of protective sensation or nail disease on today's exam.  -There is no evidence of lower vascular compromise on today's exam.  -Patient is on statin therapy.   Ordering diabetic nurse educator as patient is struggling working her  glucometer.      Health Maintenance Due   Topic Date Due   • Pneumococcal Vaccine 65+ (1 - PCV) 10/12/1961   • LUNG CANCER SCREENING  Never done   • ANNUAL PHYSICAL  Never done   • COLORECTAL CANCER SCREENING  01/01/2018   • DXA SCAN  05/01/2019   • DIABETIC EYE EXAM  01/01/2020   • PAP SMEAR  05/01/2020   • DIABETIC FOOT EXAM  09/03/2020   • URINE MICROALBUMIN  09/03/2020        Follow Up  Return in about 3 months (around 4/26/2023) for Recheck.    Patient/family had no further questions at this time and verbalized understanding of the plan discussed today.

## 2023-01-31 NOTE — PROGRESS NOTES
Balbir Bills M.D.  Internal Medicine  [facility]  [address], [address]  [place] [address]  [phone]      Chief Complaint  Neck Pain (Upper shoulders and neck pain with  movement patient had massage today  but it did not help much. /)    SUBJECTIVE    History of Present Illness    Elif Cardona is a 67 y.o. female who presents to the office today as an established patient that last saw me on 1/26/2023.     Neck pain for a couple days. She denies any inciting events. She dances regularly but did not do anything strenuous. Massage and tylenol did not help much. Denies fever or headache.     BP elevated today. She does not check BP at home. She is planning to get new cuff. She has been on amlodipine 10 mg for only 2 day.s No NSAID use. She is upset today.     Review of Systems    Allergies   Allergen Reactions   • Lisinopril Angioedema        Outpatient Medications Marked as Taking for the 1/31/23 encounter (Office Visit) with Balbir Bills MD   Medication Sig Dispense Refill   • amLODIPine (NORVASC) 10 MG tablet Take 1 tablet by mouth Daily. 30 tablet 2   • atorvastatin (LIPITOR) 20 MG tablet Take 1 tablet by mouth Daily. For cholesterol 30 tablet 2   • Blood Glucose Monitoring Suppl (Blood Glucose Monitor System) w/Device kit Use to check finger stick blood sugar 1 times per day. 1 each 0   • cetirizine (ZyrTEC) 10 MG chewable tablet Chew 10 mg daily.     • clobetasol (TEMOVATE) 0.05 % ointment Apply  topically to the appropriate area as directed Daily.     • Continuous Blood Gluc Sensor (FreeStyle Manuel Sensor System) Every 14 (Fourteen) Days. 6 each 2   • dicyclomine (BENTYL) 10 MG capsule Take 1 capsule by mouth 3 (Three) Times a Day. 270 capsule 2   • glimepiride (Amaryl) 4 MG tablet Take 1 tablet by mouth Every Morning Before Breakfast. For diabetes 30 tablet 2   • Glucose Blood (Blood Glucose Test) strip Use with blood glucose meter to check blood sugar 1 times per day. 100 each  "2   • hydrocortisone 2.5 % lotion Apply  topically to the appropriate area as directed 2 (Two) Times a Day. 10 mL 0   • Lancets Thin misc Use with lancet device to check blood glucose 1 times per day via finger stick. 100 each 2   • pioglitazone (Actos) 15 MG tablet Take 1 tablet by mouth Daily. 30 tablet 2   • Premarin 0.625 MG/GM vaginal cream APPLY A PEA-SIZED AMOUNT ON FINGERTIP AND PLACE INTO VAGINAL OPENING THREE TIMES PER WEEK     • [DISCONTINUED] Blood Glucose Monitoring Suppl (Blood Glucose Monitor System) w/Device kit Use to check finger stick blood sugar _ times per day. 1 each 0        Past Medical History:   Diagnosis Date   • Allergic rhinitis    • Benign essential hypertension 01/06/2015   • Fibromyalgia    • Generalized anxiety disorder 08/24/2012   • H/O complete eye exam 2016   • H/O exercise stress test 05/05/2005    CVA neg   • Hemorrhoids 01/27/2010   • History of bone density study    • Irritable bowel syndrome (IBS) 11/11/2009   • Major depressive disorder, recurrent episode, in full remission (HCC) 09/10/2010   • Osteoarthritis, multiple sites 06/12/2014   • Post-menopausal    • Rash and nonspecific skin eruption 07/08/2014    Angular Chelitis   • Schizoaffective disorder ([facility])    • Schizoaffective disorder, bipolar type ([facility])    • Sebaceous cyst 10/25/2011   • Sinusitis, acute      Past Surgical History:   Procedure Laterality Date   • MAMMO BILATERAL  05/2017     Family History   Problem Relation Age of Onset   • Colon cancer Other    • Depression Other    • Diabetes Other    • Heart disease Other    • Lung cancer Other     reports that she has been smoking cigarettes. She started smoking about 48 years ago. She has a 23.50 pack-year smoking history. She has never used smokeless tobacco. She reports that she does not drink alcohol and does not use drugs.    OBJECTIVE    Vital Signs:   /78   Pulse 89   Temp 97.7 °F (36.5 °C)   Ht 162.6 cm (64.02\")   Wt 70.8 kg (156 lb)   SpO2 98%  "  BMI 26.76 kg/m²     Physical Exam  Constitutional:       Appearance: Normal appearance. She is normal weight.   Neck:      Comments: Range of motion limited in all directions due to pain.  She reports pain in the paraspinal muscles.  Pulmonary:      Effort: Pulmonary effort is normal.   Abdominal:      General: Abdomen is flat. There is no distension.      Palpations: Abdomen is soft.      Tenderness: There is no abdominal tenderness.   Musculoskeletal:      Cervical back: Pain with movement present. No spinous process tenderness or muscular tenderness.   Skin:     General: Skin is warm and dry.   Neurological:      Mental Status: She is alert.   Psychiatric:         Mood and Affect: Mood normal.         Behavior: Behavior normal.         Thought Content: Thought content normal.            The following data was reviewed by: Balbir Bills MD on 01/31/2023:  Common labs    Common Labs 10/11/22 11/2/22 11/2/22 11/2/22 1/18/23 1/18/23     1421 1421 1421 1131 1131   Glucose  457 (A)       BUN  9       Creatinine  0.62       Sodium  138       Potassium  4.2       Chloride  97 (A)       Calcium  9.9       Total Protein  6.5       Albumin  4.70       Total Bilirubin  0.2       Alkaline Phosphatase  99       AST (SGOT)  13       ALT (SGPT)  12       WBC   9.35      Hemoglobin   13.9      Hematocrit   41.3      Platelets   248      Total Cholesterol    243 (A) 125    Triglycerides    254 (A) 68    HDL Cholesterol    54 62 (A)    LDL Cholesterol     143 (A) 49    Hemoglobin A1C 12.5 (A)     8.80 (A)   (A) Abnormal value       Comments are available for some flowsheets but are not being displayed.           Data reviewed: Note from last visit.             ASSESSMENT & PLAN     Diagnoses and all orders for this visit:    1. Strain of neck muscle, initial encounter (Primary)  -     cyclobenzaprine (FLEXERIL) 10 MG tablet; Take 1 tablet by mouth 3 (Three) Times a Day As Needed for Muscle Spasms for up to 7 days.  Dispense: 30  tablet; Refill: 0    2. Type 2 diabetes mellitus with diabetic autonomic neuropathy, without long-term current use of insulin ([facility])  -     Blood Glucose Monitoring Suppl (Blood Glucose Monitor System) w/Device kit; Use to check finger stick blood sugar 1 times per day.  Dispense: 1 each; Refill: 0         Discussed the cervical pain, its course and treatment.  Educational material distributed.  Discussed appropriate exercises.  Discussed appropriate use of ice and heat.  OTC analgesics as needed.  Muscle relaxants started per medication orders.  Discussed that these may be sedating so she should watch for this.  We will follow-up as needed.  Patient encouraged to reach out to us if no improvement or new or worsening symptoms.    Health Maintenance Due   Topic Date Due   • Pneumococcal Vaccine 65+ (1 - PCV) 10/12/1961   • LUNG CANCER SCREENING  Never done   • ANNUAL PHYSICAL  Never done   • COLORECTAL CANCER SCREENING  01/01/2018   • DXA SCAN  05/01/2019   • DIABETIC EYE EXAM  01/01/2020   • PAP SMEAR  05/01/2020   • DIABETIC FOOT EXAM  09/03/2020   • URINE MICROALBUMIN  09/03/2020        Follow Up  No follow-ups on file.    Patient/family had no further questions at this time and verbalized understanding of the plan discussed today.

## 2023-02-08 NOTE — PROGRESS NOTES
Balbir Bills M.D.  Internal Medicine  [facility]  [address], [address]  [place] [address]  [phone]      Chief Complaint  Shoulder Pain (Pain on left shoulder /), Diabetes (Has question about free style manuel /), and cloudy urine     SUBJECTIVE    History of Present Illness    Elif Cardona is a 67 y.o. female who presents to the office today as an established patient that last saw me on 1/31/2023.     Has left shoulder pain/neck pain. Worse in the morning. Flexeril is not helping. Advil did not help. Tylenol did not help. She put moist heat this morning which helped. Stretching helps. Not doing exercises currently. Normally she dances. Nothing aggravates the pain. Pain can keep her up at night. She even Needed help up from bed from her .     UTI symptoms-cloudy urine on Monday. No burning. She note some polyuria and polydipsia. She has been up at night to urinate. No fever    BG has been 130s-140s this week.         Review of Systems    Allergies   Allergen Reactions   • Lisinopril Angioedema        Outpatient Medications Marked as Taking for the 2/8/23 encounter (Office Visit) with Balbir Bills MD   Medication Sig Dispense Refill   • amLODIPine (NORVASC) 10 MG tablet Take 1 tablet by mouth Daily. 30 tablet 2   • atorvastatin (LIPITOR) 20 MG tablet TAKE 1 TABLET BY MOUTH DAILY FOR CHOLESTEROL 30 tablet 2   • Blood Glucose Monitoring Suppl (Blood Glucose Monitor System) w/Device kit Use to check finger stick blood sugar 1 times per day. 1 each 0   • cetirizine (ZyrTEC) 10 MG chewable tablet Chew 10 mg daily.     • clobetasol (TEMOVATE) 0.05 % ointment Apply  topically to the appropriate area as directed Daily.     • Continuous Blood Gluc Sensor (FreeStyle Manuel Sensor System) Every 14 (Fourteen) Days. 6 each 2   • dicyclomine (BENTYL) 10 MG capsule Take 1 capsule by mouth 3 (Three) Times a Day. 270 capsule 2   • glimepiride (Amaryl) 4 MG tablet Take 1 tablet by mouth Every  "Morning Before Breakfast. For diabetes 30 tablet 2   • Glucose Blood (Blood Glucose Test) strip Use with blood glucose meter to check blood sugar 1 times per day. 100 each 2   • hydrocortisone 2.5 % lotion Apply  topically to the appropriate area as directed 2 (Two) Times a Day. 10 mL 0   • Lancets Thin misc Use with lancet device to check blood glucose 1 times per day via finger stick. 100 each 2   • pioglitazone (Actos) 15 MG tablet Take 1 tablet by mouth Daily. 30 tablet 2   • Premarin 0.625 MG/GM vaginal cream APPLY A PEA-SIZED AMOUNT ON FINGERTIP AND PLACE INTO VAGINAL OPENING THREE TIMES PER WEEK          Past Medical History:   Diagnosis Date   • Allergic rhinitis    • Benign essential hypertension 01/06/2015   • Fibromyalgia    • Generalized anxiety disorder 08/24/2012   • H/O complete eye exam 2016   • H/O exercise stress test 05/05/2005    CVA neg   • Hemorrhoids 01/27/2010   • History of bone density study    • Irritable bowel syndrome (IBS) 11/11/2009   • Major depressive disorder, recurrent episode, in full remission (HCC) 09/10/2010   • Osteoarthritis, multiple sites 06/12/2014   • Post-menopausal    • Rash and nonspecific skin eruption 07/08/2014    Angular Chelitis   • Schizoaffective disorder (HCC)    • Schizoaffective disorder, bipolar type ([facility])    • Sebaceous cyst 10/25/2011   • Sinusitis, acute      Past Surgical History:   Procedure Laterality Date   • MAMMO BILATERAL  05/2017     Family History   Problem Relation Age of Onset   • Colon cancer Other    • Depression Other    • Diabetes Other    • Heart disease Other    • Lung cancer Other     reports that she has been smoking cigarettes. She started smoking about 48 years ago. She has a 23.50 pack-year smoking history. She has never used smokeless tobacco. She reports that she does not drink alcohol and does not use drugs.    OBJECTIVE    Vital Signs:   /86   Pulse 83   Temp 97.6 °F (36.4 °C)   Ht 162.6 cm (64.02\")   Wt 71.5 kg (157 lb " 9.6 oz)   SpO2 95%   BMI 27.04 kg/m²     Physical Exam  Abdominal:      General: Abdomen is flat. There is no distension.      Palpations: Abdomen is soft.      Tenderness: There is no abdominal tenderness.   Musculoskeletal:      Cervical back: Normal.      Thoracic back: No bony tenderness. Scoliosis present.        Back:       Comments: Kyphosis            The following data was reviewed by: Balbir Bills MD on 02/08/2023:  Common labs    Common Labs 10/11/22 11/2/22 11/2/22 11/2/22 1/18/23 1/18/23     1421 1421 1421 1131 1131   Glucose  457 (A)       BUN  9       Creatinine  0.62       Sodium  138       Potassium  4.2       Chloride  97 (A)       Calcium  9.9       Total Protein  6.5       Albumin  4.70       Total Bilirubin  0.2       Alkaline Phosphatase  99       AST (SGOT)  13       ALT (SGPT)  12       WBC   9.35      Hemoglobin   13.9      Hematocrit   41.3      Platelets   248      Total Cholesterol    243 (A) 125    Triglycerides    254 (A) 68    HDL Cholesterol    54 62 (A)    LDL Cholesterol     143 (A) 49    Hemoglobin A1C 12.5 (A)     8.80 (A)   (A) Abnormal value       Comments are available for some flowsheets but are not being displayed.           Data reviewed: Note from last visit             ASSESSMENT & PLAN     Diagnoses and all orders for this visit:    1. Strain of left shoulder, subsequent encounter (Primary)  Comments:  Pain not improved with home stretches. Will refer to formal PT  Orders:  -     Ambulatory Referral to Physical Therapy Evaluate and treat  -     meloxicam (Mobic) 7.5 MG tablet; Take 1 tablet by mouth Daily.  Dispense: 14 tablet; Refill: 0    2. Cloudy urine  -     Urinalysis With Culture If Indicated -  -     POC Urinalysis Dipstick, Automated    3. Acute cystitis without hematuria  Comments:  Medications: nitrofurantoin  Maintain adequate hydration  Follow up if symptoms not improving, and prn.             Health Maintenance Due   Topic Date Due   • Pneumococcal Vaccine  65+ (1 - PCV) 10/12/1961   • LUNG CANCER SCREENING  Never done   • ANNUAL PHYSICAL  Never done   • COLORECTAL CANCER SCREENING  01/01/2018   • DXA SCAN  05/01/2019   • DIABETIC EYE EXAM  01/01/2020   • PAP SMEAR  05/01/2020   • DIABETIC FOOT EXAM  09/03/2020   • URINE MICROALBUMIN  09/03/2020        Follow Up  No follow-ups on file.    Patient/family had no further questions at this time and verbalized understanding of the plan discussed today.

## 2023-02-08 NOTE — TELEPHONE ENCOUNTER
I called Elif Cardona at  at 14:58 EST.  I told her it looks like she has a urinary tract infection so we will send Macrobid to her pharmacy.  She confirms she does not have an allergy to Macrobid.  I told her if she has glucose in her urine and she stated she that she drink a sweetened iced tea from [employer].  She states that she is going to switch to unsweetened tea or artificially sweetened tea.  I told her that this is a good idea as uncontrolled diabetes can lead to urinary tract infections.  Patient voiced understanding.  All questions answered.

## 2023-03-01 NOTE — PATIENT INSTRUCTIONS
If no improvement in 7 days we will call in antibiotic    Do not take Corcidin. Instead take an antihistamine, flonase and mucinex as needed.

## 2023-03-01 NOTE — PROGRESS NOTES
Balbir Bills M.D.  Internal Medicine  [facility]  [address], [address]  [place] [address]  [phone]      Chief Complaint  Cough (X 3 days /), Sore Throat, and Nasal Congestion    SUBJECTIVE    History of Present Illness    Elif Cardona is a 67 y.o. female who presents to the office today as an established patient that last saw me on 2/8/2023.     Sinus symptoms for 3 days.  She reports Cough up brown sputum, sore throat. No fever. Her  is also sick. She has not taken home COVID test. No body aches. Some fatigue. She has congestion.She is taking Corcidin HBP, Regular Corcidin and Mucinex. Blood pressure is elevated but patient was late today and was stressed about getting to this appointment. She does smoke.  She is taking zyrtec and Flonase.       Review of Systems    Allergies   Allergen Reactions   • Lisinopril Angioedema        Outpatient Medications Marked as Taking for the 3/1/23 encounter (Office Visit) with Balbir Bills MD   Medication Sig Dispense Refill   • amLODIPine (NORVASC) 10 MG tablet Take 1 tablet by mouth Daily. 30 tablet 2   • atorvastatin (LIPITOR) 20 MG tablet TAKE 1 TABLET BY MOUTH DAILY FOR CHOLESTEROL 30 tablet 2   • Blood Glucose Monitoring Suppl (Blood Glucose Monitor System) w/Device kit Use to check finger stick blood sugar 1 times per day. 1 each 0   • cetirizine (ZyrTEC) 10 MG chewable tablet Chew 1 tablet Daily.     • clobetasol (TEMOVATE) 0.05 % ointment Apply  topically to the appropriate area as directed Daily.     • Continuous Blood Gluc Sensor (FreeStyle Manuel Sensor System) Every 14 (Fourteen) Days. 6 each 2   • cyclobenzaprine (FLEXERIL) 10 MG tablet TAKE 1 TABLET BY MOUTH THREE TIMES DAILY FOR UP TO 7 DAYS AS NEEDED FOR MUSCLE SPASMS     • dicyclomine (BENTYL) 10 MG capsule Take 1 capsule by mouth 3 (Three) Times a Day. 270 capsule 2   • glimepiride (Amaryl) 4 MG tablet Take 1 tablet by mouth Every Morning Before Breakfast. For diabetes  30 tablet 2   • Glucose Blood (Blood Glucose Test) strip Use with blood glucose meter to check blood sugar 1 times per day. 100 each 2   • hydrocortisone 2.5 % lotion Apply  topically to the appropriate area as directed 2 (Two) Times a Day. 10 mL 0   • Lancets Thin misc Use with lancet device to check blood glucose 1 times per day via finger stick. 100 each 2   • meloxicam (Mobic) 7.5 MG tablet Take 1 tablet by mouth Daily. 14 tablet 0   • pioglitazone (Actos) 15 MG tablet Take 1 tablet by mouth Daily. 30 tablet 2   • Premarin 0.625 MG/GM vaginal cream APPLY A PEA-SIZED AMOUNT ON FINGERTIP AND PLACE INTO VAGINAL OPENING THREE TIMES PER WEEK          Past Medical History:   Diagnosis Date   • Allergic rhinitis    • Benign essential hypertension 01/06/2015   • Fibromyalgia    • Generalized anxiety disorder 08/24/2012   • H/O complete eye exam 2016   • H/O exercise stress test 05/05/2005    CVA neg   • Hemorrhoids 01/27/2010   • History of bone density study    • Irritable bowel syndrome (IBS) 11/11/2009   • Major depressive disorder, recurrent episode, in full remission (HCC) 09/10/2010   • Osteoarthritis, multiple sites 06/12/2014   • Post-menopausal    • Rash and nonspecific skin eruption 07/08/2014    Angular Chelitis   • Schizoaffective disorder (HCC)    • Schizoaffective disorder, bipolar type ([facility])    • Sebaceous cyst 10/25/2011   • Sinusitis, acute      Past Surgical History:   Procedure Laterality Date   • MAMMO BILATERAL  05/2017     Family History   Problem Relation Age of Onset   • Colon cancer Other    • Depression Other    • Diabetes Other    • Heart disease Other    • Lung cancer Other     reports that she has been smoking cigarettes. She started smoking about 48 years ago. She has a 23.50 pack-year smoking history. She has never used smokeless tobacco. She reports that she does not drink alcohol and does not use drugs.    OBJECTIVE    Vital Signs:   /84   Pulse 97   Temp 98.6 °F (37 °C)   Ht  "162.6 cm (64.02\")   Wt 71.2 kg (157 lb)   SpO2 94%   BMI 26.94 kg/m²     Physical Exam  Constitutional:       Appearance: Normal appearance. She is normal weight.   HENT:      Right Ear: Tympanic membrane normal.      Left Ear: Tympanic membrane normal.      Nose: Congestion present.      Mouth/Throat:      Mouth: Mucous membranes are moist.      Pharynx: No oropharyngeal exudate or posterior oropharyngeal erythema.   Cardiovascular:      Rate and Rhythm: Normal rate and regular rhythm.      Heart sounds: Normal heart sounds. No murmur heard.  Pulmonary:      Effort: Pulmonary effort is normal. No respiratory distress.      Breath sounds: Normal breath sounds.   Abdominal:      General: Abdomen is flat. There is no distension.      Palpations: Abdomen is soft.      Tenderness: There is no abdominal tenderness.   Skin:     General: Skin is warm and dry.   Neurological:      Mental Status: She is alert.   Psychiatric:         Mood and Affect: Mood normal.         Behavior: Behavior normal.         Thought Content: Thought content normal.            The following data was reviewed by: Balbir Bills MD on 03/01/2023:  Common labs    Common Labs 10/11/22 11/2/22 11/2/22 11/2/22 1/18/23 1/18/23     1421 1421 1421 1131 1131   Glucose  457 (A)       BUN  9       Creatinine  0.62       Sodium  138       Potassium  4.2       Chloride  97 (A)       Calcium  9.9       Total Protein  6.5       Albumin  4.70       Total Bilirubin  0.2       Alkaline Phosphatase  99       AST (SGOT)  13       ALT (SGPT)  12       WBC   9.35      Hemoglobin   13.9      Hematocrit   41.3      Platelets   248      Total Cholesterol    243 (A) 125    Triglycerides    254 (A) 68    HDL Cholesterol    54 62 (A)    LDL Cholesterol     143 (A) 49    Hemoglobin A1C 12.5 (A)     8.80 (A)   (A) Abnormal value       Comments are available for some flowsheets but are not being displayed.           Data reviewed: Note from last visit             ASSESSMENT & " PLAN     Diagnoses and all orders for this visit:    1. Viral upper respiratory tract infection (Primary)    2. Cough, unspecified type  -     POCT SARS-CoV-2 Antigen ROSALINA + Flu    Suspect symptoms secondary to viral URI.   is also sick.  Discussed normal course of viral upper respiratory tract infections and these should improve without antibiotics.  Discussed that antibiotics have potential side effects including diarrhea, yeast infections, C. difficile.    Patient counseled to seek medical care for new or worsening symptoms or failure of symptoms to improve.     If no improvement in 7 days we will call in antibiotic    Health Maintenance Due   Topic Date Due   • Pneumococcal Vaccine 65+ (1 - PCV) 10/12/1961   • LUNG CANCER SCREENING  Never done   • ANNUAL PHYSICAL  Never done   • COLORECTAL CANCER SCREENING  01/01/2018   • DXA SCAN  05/01/2019   • DIABETIC EYE EXAM  01/01/2020   • PAP SMEAR  05/01/2020   • DIABETIC FOOT EXAM  09/03/2020   • URINE MICROALBUMIN  09/03/2020        Follow Up  No follow-ups on file.    Patient/family had no further questions at this time and verbalized understanding of the plan discussed today.

## 2023-03-02 NOTE — TELEPHONE ENCOUNTER
PATIENT IS CALLING BACK ABOUT THIS. PATIENT WOULD LIKE FOR SOMETHING TO BE CALLED IN TODAY    PLEASE ADVISE

## 2023-03-02 NOTE — TELEPHONE ENCOUNTER
Caller: Elif Cardona    Relationship: Self    Best call back number: [phone]    What medication are you requesting: ANTI-BIOTICS     What are your current symptoms: COUGH,CONGESTION, GREEN MUSCUS     How long have you been experiencing symptoms: 4 DAYS    Have you had these symptoms before:    [x] Yes  [] No    Have you been treated for these symptoms before:   [x] Yes  [] No    If a prescription is needed, what is your preferred pharmacy and phone number: [id number] [address] AT [facility] [phone] [facility] [phone]

## 2023-03-03 NOTE — TELEPHONE ENCOUNTER
Spoke with patient gave her Dr. Bills's instructions/recommendations. Pt verbalized understanding.

## 2023-03-08 NOTE — ED NOTES
Pt arrived via ems from home. Pt was found down by her . Pt was last seen alive at 1200 today. Pt had no pulse and was asystole upon ems arrival.     This RN wore mask, gloves, eyewear and all other appropriate PPE while performing patient care.

## 2023-03-08 NOTE — ED PROVIDER NOTES
EMERGENCY DEPARTMENT ENCOUNTER    Room Number:  EDGARD/EDGARD  Date seen:  3/8/2023  PCP: Balbir Bills MD  Historian: EMS report      HPI:  Chief Complaint: Cardiac arrest  A complete HPI/ROS/PMH/PSH/SH/FH are unobtainable due to: None  Context: Elif Cardona is a 67 y.o. female who presents to the ED in full cardiac arrest.  Per the patient's spouse, she was last seen normal at around noon today.  He came home just prior to the EMS called and found her unresponsive.  Upon EMS arrival, they found her unresponsive and asystolic on the monitor.  CPR was begun immediately and she was intubated orally.  In route, she was given 6 rounds of IV epinephrine as well as continuous CPR via Dejuan device per ACLS protocol.  Blood sugar was just over 200 per their reports.  EMS does report that she recently had a flulike illness but denies any trauma or any other physical complaints.          PAST MEDICAL HISTORY  Active Ambulatory Problems     Diagnosis Date Noted   • Allergic rhinitis    • Fibromyalgia    • Generalized anxiety disorder 08/24/2012   • Hemorrhoids 01/27/2010   • Benign essential hypertension 01/06/2015   • Irritable bowel syndrome (IBS)    • Major depressive disorder, recurrent episode, in full remission ([facility]) 09/10/2010   • Osteoarthritis, multiple sites 06/12/2014   • Post-menopausal    • Rash and nonspecific skin eruption 07/08/2014   • Sebaceous cyst 10/25/2011   • Schizoaffective disorder, bipolar type ([facility]) 03/17/2016   • Hyperglycemia 03/18/2016   • Tobacco abuse 03/18/2016   • Primary hypertension 03/18/2016   • Peripheral neuropathic pain 06/22/2016   • Solitary pulmonary nodule on lung CT 01/09/2017   • Pancreatic divisum 01/09/2017   • Acute sinusitis 12/21/2017   • Type 2 diabetes mellitus with diabetic autonomic neuropathy, without long-term current use of insulin ([facility]) 05/16/2019   • Hyperlipidemia 12/01/2022     Resolved Ambulatory Problems     Diagnosis Date Noted   • Sinusitis, acute    • Urinary  tract infection without hematuria 08/24/2016   • C. difficile colitis 01/09/2017   • Sepsis (HCC) 01/09/2017   • IFG (impaired fasting glucose) 02/15/2018     Past Medical History:   Diagnosis Date   • H/O complete eye exam 2016   • H/O exercise stress test 05/05/2005   • History of bone density study    • Schizoaffective disorder (HCC)          PAST SURGICAL HISTORY  Past Surgical History:   Procedure Laterality Date   • MAMMO BILATERAL  05/2017         FAMILY HISTORY  Family History   Problem Relation Age of Onset   • Colon cancer Other    • Depression Other    • Diabetes Other    • Heart disease Other    • Lung cancer Other          SOCIAL HISTORY  Social History     Socioeconomic History   • Marital status:    Tobacco Use   • Smoking status: Some Days     Packs/day: 0.50     Years: 47.00     Pack years: 23.50     Types: Cigarettes     Start date: 1/1/1975   • Smokeless tobacco: Never   Vaping Use   • Vaping Use: Never used   Substance and Sexual Activity   • Alcohol use: No   • Drug use: No   • Sexual activity: Yes     Partners: Male         ALLERGIES  Lisinopril        REVIEW OF SYSTEMS  Review of Systems   Unable to perform ROS: Intubated          PHYSICAL EXAM  ED Triage Vitals   Temp Pulse Resp BP SpO2   -- -- -- -- --      Temp src Heart Rate Source Patient Position BP Location FiO2 (%)   -- -- -- -- --       Physical Exam      GENERAL: Continuous and ongoing CPR, unresponsive  HENT: nares patent  EYES: no scleral icterus, pupils fixed and dilated bilaterally  CV: No spontaneous cardiac activity  RESPIRATORY: No spontaneous respiratory activity  ABDOMEN: Distended, nonrigid  MUSCULOSKELETAL: no deformity, no edema  NEURO: Unresponsive  SKIN: warm, dry, normal color    Vital signs and nursing notes reviewed.          LAB RESULTS  No results found for this or any previous visit (from the past 24 hour(s)).    Ordered the above labs and reviewed the results.        RADIOLOGY  No Radiology Exams Resulted  Within Past 24 Hours    Ordered the above noted radiological studies. Reviewed by me in PACS.            PROCEDURES  Procedures            MEDICATIONS GIVEN IN ER  Medications   EPINEPHrine (ADRENALIN) injection (1 mg Intravenous Given 3/7/23 2002)                   MEDICAL DECISION MAKING, PROGRESS, and CONSULTS    All labs have been independently reviewed by me.  All radiology studies have been reviewed by me and I have also reviewed the radiology report.   EKG's independently viewed and interpreted by me.  Discussion below represents my analysis of pertinent findings related to patient's condition, differential diagnosis, treatment plan and final disposition.      Additional sources:  - Discussed/ obtained information from independent historians: Entire history obtained from EMS    - External (non-ED) record review: Upon medical records review, the patient was last seen and evaluated by her primary care provider in office on 3/1/2023 where she was treated for a viral upper respiratory infection with a cough.    - Chronic or social conditions impacting care: Type 2 diabetes mellitus      Orders placed during this visit:  Orders Placed This Encounter   Procedures   • SCANNED - TELEMETRY               Differential diagnosis:    Differential diagnosis includes but is not limited to asystole, ventricular cardiac arrest, acute coronary syndrome, pulmonary embolism causing cardiac arrest, severe electrolyte disturbance causing cardiac arrest, or hypoglycemia.      Independent interpretation of labs, radiology studies, and discussions with consultants:      ED Course as of 03/08/23 2308   Tue Mar 07, 2023   2004 Upon arrival, the patient presented in asystole with ongoing CPR via the Dejuan device.  She had already received 6 rounds of IV epinephrine in route.  We did continue continuous CPR via the Dejuan device and gave 2 more rounds of IV epinephrine per ACLS protocol.  Her end-tidal CO2 at one-point was in the 40s but  at the time of cessation of the code was 14.  At this point, on pulse check, the patient remained asystolic and the code was terminated and the patient was pronounced . [BM]      ED Course User Index  [BM] Janak Romo MD         The patient was wearing a facemask upon entrance into the room and remained in such throughout their visit.  I was wearing PPE including a facemask, eye protection, as well as gloves at any point entering the room and throughout the visit      DIAGNOSIS  Final diagnoses:   Cardiopulmonary arrest (HCC)   Asystole (HCC)         DISPOSITION              Latest Documented Vital Signs:  As of 23:08 EST  BP-   HR- (!) 0 Temp-   O2 sat-                --    Please note that portions of this were completed with a voice recognition program.       Note Disclaimer: At [facility], we believe that sharing information builds trust and better relationships. You are receiving this note because you are receiving care at [facility] or recently visited. It is possible you will see health information before a provider has talked with you about it. This kind of information can be easy to misunderstand. To help you fully understand what it means for your health, we urge you to discuss this note with your provider.         Janak Romo MD  [phone]